# Patient Record
Sex: MALE | Race: WHITE | NOT HISPANIC OR LATINO | Employment: OTHER | URBAN - METROPOLITAN AREA
[De-identification: names, ages, dates, MRNs, and addresses within clinical notes are randomized per-mention and may not be internally consistent; named-entity substitution may affect disease eponyms.]

---

## 2023-07-28 LAB
ALANINE AMINOTRANSFERASE (SGPT) (U/L) IN SER/PLAS: 22 U/L (ref 10–52)
ALBUMIN (G/DL) IN SER/PLAS: 4.1 G/DL (ref 3.4–5)
ALKALINE PHOSPHATASE (U/L) IN SER/PLAS: 49 U/L (ref 33–136)
ANION GAP IN SER/PLAS: 12 MMOL/L (ref 10–20)
ASPARTATE AMINOTRANSFERASE (SGOT) (U/L) IN SER/PLAS: 16 U/L (ref 9–39)
BILIRUBIN TOTAL (MG/DL) IN SER/PLAS: 0.5 MG/DL (ref 0–1.2)
C REACTIVE PROTEIN (MG/L) IN SER/PLAS: 2.18 MG/DL
CALCIUM (MG/DL) IN SER/PLAS: 9.6 MG/DL (ref 8.6–10.6)
CARBON DIOXIDE, TOTAL (MMOL/L) IN SER/PLAS: 29 MMOL/L (ref 21–32)
CHLORIDE (MMOL/L) IN SER/PLAS: 103 MMOL/L (ref 98–107)
CHOLESTEROL (MG/DL) IN SER/PLAS: 231 MG/DL (ref 0–199)
CHOLESTEROL IN HDL (MG/DL) IN SER/PLAS: 60.9 MG/DL
CHOLESTEROL/HDL RATIO: 3.8
CITRULLINE ANTIBODY, IGG: <1 U/ML
CREATINE KINASE (U/L) IN SER/PLAS: 139 U/L (ref 0–325)
CREATININE (MG/DL) IN SER/PLAS: 0.85 MG/DL (ref 0.5–1.3)
ERYTHROCYTE DISTRIBUTION WIDTH (RATIO) BY AUTOMATED COUNT: 11.8 % (ref 11.5–14.5)
ERYTHROCYTE MEAN CORPUSCULAR HEMOGLOBIN CONCENTRATION (G/DL) BY AUTOMATED: 33.9 G/DL (ref 32–36)
ERYTHROCYTE MEAN CORPUSCULAR VOLUME (FL) BY AUTOMATED COUNT: 96 FL (ref 80–100)
ERYTHROCYTES (10*6/UL) IN BLOOD BY AUTOMATED COUNT: 4.26 X10E12/L (ref 4.5–5.9)
GFR MALE: >90 ML/MIN/1.73M2
GLUCOSE (MG/DL) IN SER/PLAS: 102 MG/DL (ref 74–99)
HEMATOCRIT (%) IN BLOOD BY AUTOMATED COUNT: 41 % (ref 41–52)
HEMOGLOBIN (G/DL) IN BLOOD: 13.9 G/DL (ref 13.5–17.5)
LDL: 135 MG/DL (ref 0–99)
LEUKOCYTES (10*3/UL) IN BLOOD BY AUTOMATED COUNT: 10.1 X10E9/L (ref 4.4–11.3)
NRBC (PER 100 WBCS) BY AUTOMATED COUNT: 0 /100 WBC (ref 0–0)
PLATELETS (10*3/UL) IN BLOOD AUTOMATED COUNT: 267 X10E9/L (ref 150–450)
POTASSIUM (MMOL/L) IN SER/PLAS: 4.9 MMOL/L (ref 3.5–5.3)
PROTEIN TOTAL: 6.7 G/DL (ref 6.4–8.2)
RHEUMATOID FACTOR (IU/ML) IN SERUM OR PLASMA: <10 IU/ML (ref 0–15)
SEDIMENTATION RATE, ERYTHROCYTE: 36 MM/H (ref 0–20)
SODIUM (MMOL/L) IN SER/PLAS: 139 MMOL/L (ref 136–145)
TRIGLYCERIDE (MG/DL) IN SER/PLAS: 178 MG/DL (ref 0–149)
UREA NITROGEN (MG/DL) IN SER/PLAS: 20 MG/DL (ref 6–23)
VLDL: 36 MG/DL (ref 0–40)

## 2023-07-31 LAB — ALDOLASE SERUM: 3.7 U/L (ref 1.2–7.6)

## 2023-08-26 PROBLEM — M81.0 AGE RELATED OSTEOPOROSIS: Status: ACTIVE | Noted: 2023-08-26

## 2023-08-26 PROBLEM — R41.841 COGNITIVE COMMUNICATION DEFICIT: Status: ACTIVE | Noted: 2023-08-26

## 2023-08-26 PROBLEM — J38.3 GLOTTIC INSUFFICIENCY: Status: ACTIVE | Noted: 2023-08-26

## 2023-08-26 PROBLEM — Z79.01 CHRONIC ANTICOAGULATION: Status: ACTIVE | Noted: 2023-08-26

## 2023-08-26 PROBLEM — H83.8X9 SUPERIOR SEMICIRCULAR CANAL DEHISCENCE: Status: ACTIVE | Noted: 2023-08-26

## 2023-08-26 PROBLEM — J38.02 BILATERAL VOCAL CORD PARESIS: Status: ACTIVE | Noted: 2023-08-26

## 2023-08-26 PROBLEM — J38.4 VOCAL CORD EDEMA: Status: ACTIVE | Noted: 2023-08-26

## 2023-08-26 PROBLEM — H90.3 ASYMMETRIC SNHL (SENSORINEURAL HEARING LOSS): Status: ACTIVE | Noted: 2023-08-26

## 2023-08-26 PROBLEM — H91.93 HEARING LOSS, BILATERAL: Status: ACTIVE | Noted: 2023-08-26

## 2023-08-26 PROBLEM — H90.A31 MIXED CONDUCTIVE AND SENSORINEURAL HEARING LOSS OF RIGHT EAR WITH RESTRICTED HEARING OF LEFT EAR: Status: ACTIVE | Noted: 2023-08-26

## 2023-08-26 PROBLEM — R48.8 OTHER SYMBOLIC DYSFUNCTIONS: Status: ACTIVE | Noted: 2023-08-26

## 2023-08-26 PROBLEM — I26.99 MULTIPLE PULMONARY EMBOLI (MULTI): Status: ACTIVE | Noted: 2023-08-26

## 2023-08-26 PROBLEM — M79.605 PAIN IN BOTH LOWER EXTREMITIES: Status: ACTIVE | Noted: 2023-08-26

## 2023-08-26 PROBLEM — M79.604 PAIN IN BOTH LOWER EXTREMITIES: Status: ACTIVE | Noted: 2023-08-26

## 2023-08-26 PROBLEM — M79.10 MYALGIA: Status: ACTIVE | Noted: 2023-08-26

## 2023-08-26 PROBLEM — M35.3 POLYMYALGIA RHEUMATICA (MULTI): Status: ACTIVE | Noted: 2023-08-26

## 2023-08-26 PROBLEM — J38.7 PRESBYLARYNGES: Status: ACTIVE | Noted: 2023-08-26

## 2023-08-26 PROBLEM — E78.2 COMBINED HYPERLIPIDEMIA: Status: ACTIVE | Noted: 2023-08-26

## 2023-08-26 PROBLEM — R49.0 HOARSENESS OF VOICE: Status: ACTIVE | Noted: 2023-08-26

## 2023-08-26 RX ORDER — OMEPRAZOLE 40 MG/1
40 CAPSULE, DELAYED RELEASE ORAL
COMMUNITY

## 2023-08-26 RX ORDER — CITALOPRAM 20 MG/1
20 TABLET, FILM COATED ORAL DAILY
COMMUNITY

## 2023-08-26 RX ORDER — PREDNISONE 1 MG/1
1 TABLET ORAL DAILY
COMMUNITY
End: 2023-10-02 | Stop reason: SDUPTHER

## 2023-08-26 RX ORDER — ATORVASTATIN CALCIUM 40 MG/1
40 TABLET, FILM COATED ORAL DAILY
COMMUNITY

## 2023-08-26 RX ORDER — TADALAFIL 5 MG/1
5 TABLET ORAL AS NEEDED
COMMUNITY

## 2023-08-26 RX ORDER — PREDNISONE 5 MG/1
5 TABLET ORAL DAILY
COMMUNITY
End: 2023-10-02 | Stop reason: SDUPTHER

## 2023-08-26 RX ORDER — LISINOPRIL 10 MG/1
10 TABLET ORAL DAILY
COMMUNITY

## 2023-09-18 ENCOUNTER — HOSPITAL ENCOUNTER (OUTPATIENT)
Dept: DATA CONVERSION | Facility: HOSPITAL | Age: 68
Discharge: HOME | End: 2023-09-18
Attending: OTOLARYNGOLOGY | Admitting: OTOLARYNGOLOGY

## 2023-09-18 DIAGNOSIS — H83.11 LABYRINTHINE FISTULA, RIGHT EAR: ICD-10-CM

## 2023-09-18 DIAGNOSIS — H90.3 SENSORINEURAL HEARING LOSS, BILATERAL: ICD-10-CM

## 2023-09-18 DIAGNOSIS — H83.8X9 OTHER SPECIFIED DISEASES OF INNER EAR, UNSPECIFIED EAR: ICD-10-CM

## 2023-09-18 DIAGNOSIS — H90.71 MIXED CONDUCTIVE AND SENSORINEURAL HEARING LOSS, UNILATERAL, RIGHT EAR, WITH UNRESTRICTED HEARING ON THE CONTRALATERAL SIDE: ICD-10-CM

## 2023-09-30 NOTE — H&P
History & Physical Reviewed:   I have reviewed the History and Physical dated:  06-Sep-2023   History and Physical reviewed and relevant findings noted. Patient examined to review pertinent physical  findings.: No significant changes   Home Medications Reviewed: no changes noted   Allergies Reviewed: no changes noted       ERAS (Enhanced Recovery After Surgery):  ·  ERAS Patient: no     Consent:   COVID-19 Consent:  ·  COVID-19 Risk Consent Surgeon has reviewed key risks related to the risk of telma COVID-19 and if they contract COVID-19 what the risks are.     Attestation:   Note Completion:  I am a:  Resident/Fellow   Attending Attestation I saw and evaluated the patient.  I personally obtained the key and critical portions of the history and physical exam or was physically present for key and  critical portions performed by the resident/fellow. I reviewed the resident/fellow?s documentation and discussed the patient with the resident/fellow.  I agree with the resident/fellow?s medical decision making as documented in the note.     I personally evaluated the patient on 18-Sep-2023         Electronic Signatures:  Tabatha Pelayo)  (Signed 18-Sep-2023 13:03)   Authored: Note Completion   Co-Signer: History & Physical Reviewed, ERAS, Consent, Note Completion  Zakia Longoria (Resident))  (Signed 17-Sep-2023 21:14)   Authored: History & Physical Reviewed, ERAS, Consent,  Note Completion      Last Updated: 18-Sep-2023 13:03 by Tabatha Pelayo)

## 2023-10-01 NOTE — OP NOTE
PROCEDURE DETAILS    Preoperative Diagnosis:  1. Right mixed hearing loss  2. Bilateral sensorineural hearing loss  3. Right superior semicircular canal dehiscence   Postoperative Diagnosis:  1. Right mixed hearing loss  2. Bilateral sensorineural hearing loss  3. Right superior semicircular canal dehiscence   Surgeon: Tabatha Pelayo MD  Resident/Fellow/Other Assistant: Zakia Longoria MD     Procedure:  1.  Cochlear Implantation (01520)   -Right    2. Right transmastoid plugging of round window    3. Needle electromyography; cranial nerve supplied muscle(s), unilateral   - Facial nerve.   - Right    4. Microsurgical techniques, requiring use of operating microscope   - Right   Anesthesia: GETA  Estimated Blood Loss: 5  Findings: see op note  Specimens(s) Collected: no,     Complications: none  Implants:   Patient Returned To/Condition: pacu/satisfactory         Operative Report:   Findings:   1. Standard mastoidectomy performed and facial recess opened, round window visualized without issue  2. Round window insertion of  implant from Cochlear, full insertion achieved  3. Round window plugged with fascia and bone james   4. Electrical conduction studies with audiology at end of case confirmed appropriate stimulation of the implant  5. Unable to access superior semicircular canal via transmastoid approach due to narrow space between lateral canal and tegmen     Operative Indications:   Mr. Murguia is a 69yo male with a history of Right moderate to severe mixed hearing loss and Left  moderate to severe sensorineural hearing loss. He also has imaging and symptoms of right superior semicircular canal dehiscence.  Patient was counseled extensively  regarding possible auditory rehabilitative options. Cochlear implant evaluation was completed. Patient was determined to be an appropriate audiometric candidate. Patient's temporal bone CT scan was obtained. He was counseled regarding the expectations,  motivation,  and benefits, risks discussed included, but not limited to, bleeding, infection, device failure, facial paralysis, dizziness, taste disturbance, other unusual complications may occur as well. After this discussion he provided written informed  consent to proceed.    Procedure in Detail:   The patient was seen in preop. Consent was obtained as described above. Correct site was marked. Patient was then taken back to the operative suite and laid supine on the operating table. Pre-operative huddle was performed by Dr. Pelayo. Following induction  of anesthesia, patient was intubated without any difficulty. Perioperative antibiotics were administered. The table was turned 180 degrees. Patient was supported to surgical bed with tapes and belts. The electrodes of the facial nerve monitoring system  were inserted percutaneously in the corresponding position and adequate functioning of the circuit was confirmed. Hair was clipped from his postauricular area. The right ear was clearly exposed.  A pre-incision timeout was performed by Dr. Pelayo. The  postauricular region was injected with a mixture of 1% lidocaine and 1:100,000 epinephrine. The skin was prepped and draped.     A postauricular incision situated about 2 mm behind the postauricular sulcus measuring about 6cm was made using a 15 blade. An anteriorly based flap just superficial to the post-auricular muscle was raised. A posterior flap was also raised at a similar  level. Then a muscular pericranial flap was outlined and elevated exposing the pars squamosa, the temporal bone and the mastoid.     Next, using a combination of cutting and claribel burs, the mastoidectomy was completed. The facial recess was opened with a small claribel bur. Chorda tympani and facial nerve were identified and preserved, and the round window was visualized. A 27g needle  was used to john the round window membrane. Irrigation was performed as well as hemostasis.    A subperiosteal pocket  was created postero-superiorly to place the internal  stimulator. This was done by using blunt dissection, and the size was confirmed using the template. The internal  stimulator was placed in the pocket. Another  pocket was created anteriorly to house the extra temporal electrode. Following this, the implant was opened, the internal  stimulator was placed in the pocket. The active electrode array was introduced into the round window and full insertion  was achieved. No CSF pressure was seen. A fascia plug was inserted into the round window and then bone james was placed to seal the round window. This was done as we were unable to access the superior semicircular canal to repair the dehiscence and did  not want to propagate his third window symptoms. The space between the tegmen and lateral canal was too narrow and we couldn't access it safely or sufficiently enough to perform superior canal plugging. The ground electrode was placed in the extra temporal  pocket. Intra-op xray confirmed appropriate position of the implant.    The muscular flap was then closed over the mastoidectomy cavity and cochlear implant electrodes using interrupted 3-0 vicryl sutures. Dermal closure was done using buried interrupted 3-0 Vicryl, and skin was closed with running 5-0 fast for skin. Impedances  and neural response telemetries were checked and were adequate.     Nato dressing was applied. All needle and sponge counts were correct. The table was turned back towards the anesthesia team, and the patient was awakened and transferred to the PACU in stable condition.    Dr. Pelayo was present and participated in all critical portions of the procedure.,                        Attestation:   Note Completion:  Attending Attestation I was present for key portions of the procedure and the procedure lasted longer than 5 minutes.    I am a: Resident/Fellow         Electronic Signatures:  Tabatha Pelayo)  (Signed  18-Sep-2023 13:24)   Authored: Note Completion   Co-Signer: Post-Operative Note, Chart Review, Note Completion  Zakia Longoria (Resident))  (Signed 18-Sep-2023 11:38)   Authored: Post-Operative Note, Chart Review, Note Completion      Last Updated: 18-Sep-2023 13:24 by Tabatha Pelayo)

## 2023-10-02 DIAGNOSIS — M35.3 POLYMYALGIA RHEUMATICA (MULTI): Primary | ICD-10-CM

## 2023-10-02 DIAGNOSIS — M35.3 POLYMYALGIA RHEUMATICA (MULTI): ICD-10-CM

## 2023-10-02 RX ORDER — PREDNISONE 1 MG/1
2 TABLET ORAL DAILY
Qty: 40 TABLET | Refills: 1 | Status: SHIPPED | OUTPATIENT
Start: 2023-10-02 | End: 2023-10-02 | Stop reason: ENTERED-IN-ERROR

## 2023-10-02 RX ORDER — PREDNISONE 5 MG/1
5 TABLET ORAL DAILY
Qty: 30 TABLET | Refills: 1 | Status: SHIPPED | OUTPATIENT
Start: 2023-10-02 | End: 2023-12-04 | Stop reason: SDUPTHER

## 2023-10-02 RX ORDER — PREDNISONE 5 MG/1
5 TABLET ORAL DAILY
Qty: 30 TABLET | Refills: 1 | Status: SHIPPED | OUTPATIENT
Start: 2023-10-02 | End: 2023-10-02 | Stop reason: ENTERED-IN-ERROR

## 2023-10-04 ENCOUNTER — APPOINTMENT (OUTPATIENT)
Dept: LAB | Facility: LAB | Age: 68
End: 2023-10-04
Payer: MEDICARE

## 2023-10-04 ENCOUNTER — OFFICE VISIT (OUTPATIENT)
Dept: RHEUMATOLOGY | Facility: CLINIC | Age: 68
End: 2023-10-04
Payer: MEDICARE

## 2023-10-04 VITALS
WEIGHT: 196.8 LBS | BODY MASS INDEX: 25.26 KG/M2 | SYSTOLIC BLOOD PRESSURE: 126 MMHG | HEART RATE: 74 BPM | HEIGHT: 74 IN | DIASTOLIC BLOOD PRESSURE: 69 MMHG

## 2023-10-04 DIAGNOSIS — M35.3 POLYMYALGIA RHEUMATICA (MULTI): Primary | ICD-10-CM

## 2023-10-04 LAB
CRP SERPL-MCNC: 0.91 MG/DL
ERYTHROCYTE [DISTWIDTH] IN BLOOD BY AUTOMATED COUNT: 11.8 % (ref 11.5–14.5)
HCT VFR BLD AUTO: 42.8 % (ref 41–52)
HGB BLD-MCNC: 14.2 G/DL (ref 13.5–17.5)
MCH RBC QN AUTO: 31.8 PG (ref 26–34)
MCHC RBC AUTO-ENTMCNC: 33.2 G/DL (ref 32–36)
MCV RBC AUTO: 96 FL (ref 80–100)
NRBC BLD-RTO: 0 /100 WBCS (ref 0–0)
PLATELET # BLD AUTO: 282 X10*3/UL (ref 150–450)
PMV BLD AUTO: 10.3 FL (ref 7.5–11.5)
RBC # BLD AUTO: 4.46 X10*6/UL (ref 4.5–5.9)
WBC # BLD AUTO: 10.5 X10*3/UL (ref 4.4–11.3)

## 2023-10-04 PROCEDURE — 99213 OFFICE O/P EST LOW 20 MIN: CPT | Mod: PO | Performed by: INTERNAL MEDICINE

## 2023-10-04 PROCEDURE — 86140 C-REACTIVE PROTEIN: CPT

## 2023-10-04 PROCEDURE — 1160F RVW MEDS BY RX/DR IN RCRD: CPT | Performed by: INTERNAL MEDICINE

## 2023-10-04 PROCEDURE — 36415 COLL VENOUS BLD VENIPUNCTURE: CPT

## 2023-10-04 PROCEDURE — 1126F AMNT PAIN NOTED NONE PRSNT: CPT | Performed by: INTERNAL MEDICINE

## 2023-10-04 PROCEDURE — 99213 OFFICE O/P EST LOW 20 MIN: CPT | Performed by: INTERNAL MEDICINE

## 2023-10-04 PROCEDURE — 85027 COMPLETE CBC AUTOMATED: CPT

## 2023-10-04 PROCEDURE — 1159F MED LIST DOCD IN RCRD: CPT | Performed by: INTERNAL MEDICINE

## 2023-10-04 PROCEDURE — 1036F TOBACCO NON-USER: CPT | Performed by: INTERNAL MEDICINE

## 2023-10-04 NOTE — PROGRESS NOTES
Subjective   Patient ID: Ulysses Murguia is a 68 y.o. male who presents for Follow-up.    HPI  This is a 69 yo male, is here as a new patient with the chief complain of bilateral proximal lower extremity pain and weakness for 3 years.   Patient says that he started having pain and weakness in his thighs about 2-3 years ago. He had trouble getting up from a chair, getting up from the toilet seat. He was first seen by an orthopedic surgeon and initially thought his symptoms were due to swollen knees. But after doing x-rays of his knees he was told that his symptoms are not due to knee problem, but most likely due to PMR. Then he was seen by a nurse practitioner around 02/2021 and started him on prednisone course (15mgx 5d, 10mgx 5d, 5mgx 5d)for which he responded fast. He felt better the next day. Once he stopped prednisone he did ok for a while. But around spring of 2022 his symptoms came back and this time it was severe. He was seen by multiple rheumatologist at [facility] and was told he does not have PMR and was sent home. Around 01/2023 his symptoms got worsen to the point he couldn't again get up from a seated position. He was prescribed a course of prednisone by a general practitioner at [place]. HE was on prednisone 15mg for few days, then 10mg for few days and then stayed for 5mg for few weeks. But while on prednisone 5mg around 04/2023 his symptoms got worse. So he himself increased the dose to 15mg for 5 days and now on 10mg for the last 3 days.  He denies any significant pain or weakness in his upper extremities. Denies any headaches, blurry vision, jaw pain, numbness or tingling. Denies any joint pain or swelling. AM stiffness lasts for one hour. He also c/o trouble opening jars.   He has a hx of fall in the boat and had a fracture in his lumbar spine in the past.   He had extensive blood work in the past which showed normal ESR, CPK, AST, ALT, aldolase. He only had one positive CRP of 1.6 on 10/2022, rest of the  labs were normal. Hs has been on a statin since 10/2022.     07/23  When he tapered his PRD to 10 mg, his symptoms came back. He called me and we increased his PRD dose to 12.5 mg daily.  Today, he is doing better. He reports only mild thigh pain, but no stiffness.  He denies any cranial symptoms, visual problem.     PMHx: HTN, HLD, hearing loss, hx of thrombophlebitis and PE  Allergies: NKDA  Family hx: no family hx of autoimmune diseses  Social hx: denies smoking, alcohol or other recreational drugs, wife is a retired anesthesiologist      Interval history:  Today, he is doing well. He denies any hip pian, AM stiffness or any other joint pain.  He tapered his PRD to 8 mg daily    ROS  Joint pain in hands: negative  Joint swelling: negative  Morning stiffness and duration:   strength: normal  Oral ulcer: negative  Genital ulcer: negative  Raynaud phenomenon: negative  Chest pain/dyspnea: negative  Low back pain: negative  Visual problem: negative  Dry eyes/dry mouth: negative  Skin rash/scaling/psoriasis: negative       Objective     PEXAM  VS reviewed, WNL  General: Alert, no distress   HEENT: Normocephalic/atraumatic, No alopecia. PERRLA. Sclera white, conjunctiva pink, no malar rash. no oral or nasal ulcer. Oral cavity pink and moist, no erythema or exudate, dentition good.   Neck: supple  Respiratory: CTA B, no adventitious breath sounds  Cardiac: RRR, no murmurs, carotid, or bruits  Abdominal: symmetrical, soft, non-tender, non-distended, normoactive BSx4 quadrants, no CVA tenderness or suprapubic tenderness  MSK: Joints of upper and lower extremities were assessed for synovitis and ROM.    Today she has no evidence of synovitis in the joints of her hands or wrists, tender joint count 0, swollen joint count 0   Extremities: no clubbing, no cyanosis, no edema  Skin: Skin warm and moist.   Neuro: non-focal, Strength 5/5 throughout. Normal gait. No cerebellar pathologic exam     Assessment/Plan   This is a 68  yo male, is here as a new patient with the pain and weakness in bilateral proximal lower extremity for 2-3 years.     Assessment:  He has some positive symptoms and signs suggestive of PMR including pain in thighs responding to prednisone, one time elevated CRP in 10/2022. But he also has some atypical symptoms. He has weakness which is not very typical for PMR and his ESR, CRP, CPK has been normal most of the times which argues against PMR.  He does not have any GCA symptoms.     He labs showed normal ESR, CPK, AST, ALT, aldolase. He only had one positive CRP of 1.6 on 10/2022, rest of the labs were normal. Hs has been on a statin since 10/2022.    At this time his diagnosis is unclear. But because his symptoms are responding to prednisone we will treat empirically for possible PMR with longer prednisone taper. He stopped statin treatment due to possible statin associated myalgia. But, he has not noted any changes.   Overall, he was diagnosed with PMR before and steroid significantly helped his pain. Then, he stopped PRD and he had severe symptoms, he started PRD again. We saw him and tried steroid tapering, but could not tolerate tapering after 12.5 mg.  His PMR is atypical, it presented with lower extremity proximal symptoms and knee pain, swelling, no upper extremity symptoms, cranial symptoms or peripheral arthritis, classical morning stiffness. Also, his acute phases were normal. Overall, his PMR diagnosis is suspicious, but responded steroid before.  Today, he is doing well without any active joint pian, hip pain or AM stiffness.  He tapered his PRD to 8 mg daily and tolerated it very well.    -will check his ESR, cRP, CBC  -will continue steroid tapering 1 mg very 2 weeks until 5 mg, after 5 mg , will taper it 2 mg every 3 weeks.  -will see him in 2 months

## 2023-10-17 ENCOUNTER — OFFICE VISIT (OUTPATIENT)
Dept: OTOLARYNGOLOGY | Facility: CLINIC | Age: 68
End: 2023-10-17
Payer: MEDICARE

## 2023-10-17 ENCOUNTER — CLINICAL SUPPORT (OUTPATIENT)
Dept: AUDIOLOGY | Facility: CLINIC | Age: 68
End: 2023-10-17
Payer: MEDICARE

## 2023-10-17 VITALS — TEMPERATURE: 97.5 F | WEIGHT: 199.5 LBS | BODY MASS INDEX: 25.6 KG/M2 | HEIGHT: 74 IN

## 2023-10-17 DIAGNOSIS — H90.3 ASYMMETRIC SNHL (SENSORINEURAL HEARING LOSS): Primary | ICD-10-CM

## 2023-10-17 DIAGNOSIS — J38.7 PRESBYLARYNGES: ICD-10-CM

## 2023-10-17 DIAGNOSIS — H90.A31 MIXED CONDUCTIVE AND SENSORINEURAL HEARING LOSS OF RIGHT EAR WITH RESTRICTED HEARING OF LEFT EAR: ICD-10-CM

## 2023-10-17 DIAGNOSIS — Z98.890 POST-OPERATIVE STATE: ICD-10-CM

## 2023-10-17 DIAGNOSIS — H90.3 SENSORINEURAL HEARING LOSS (SNHL) OF BOTH EARS: Primary | ICD-10-CM

## 2023-10-17 DIAGNOSIS — Z96.21 COCHLEAR IMPLANT IN PLACE: ICD-10-CM

## 2023-10-17 PROCEDURE — 92553 AUDIOMETRY AIR & BONE: CPT | Mod: 59 | Performed by: AUDIOLOGIST

## 2023-10-17 PROCEDURE — 1159F MED LIST DOCD IN RCRD: CPT | Performed by: OTOLARYNGOLOGY

## 2023-10-17 PROCEDURE — 1160F RVW MEDS BY RX/DR IN RCRD: CPT | Performed by: OTOLARYNGOLOGY

## 2023-10-17 PROCEDURE — 99024 POSTOP FOLLOW-UP VISIT: CPT | Performed by: OTOLARYNGOLOGY

## 2023-10-17 PROCEDURE — 1126F AMNT PAIN NOTED NONE PRSNT: CPT | Performed by: OTOLARYNGOLOGY

## 2023-10-17 PROCEDURE — 92603 COCHLEAR IMPLT F/UP EXAM 7/>: CPT | Performed by: AUDIOLOGIST

## 2023-10-17 PROCEDURE — 1036F TOBACCO NON-USER: CPT | Performed by: OTOLARYNGOLOGY

## 2023-10-17 PROCEDURE — 92567 TYMPANOMETRY: CPT | Mod: 59 | Performed by: AUDIOLOGIST

## 2023-10-17 ASSESSMENT — PATIENT HEALTH QUESTIONNAIRE - PHQ9
SUM OF ALL RESPONSES TO PHQ9 QUESTIONS 1 AND 2: 0
1. LITTLE INTEREST OR PLEASURE IN DOING THINGS: NOT AT ALL
2. FEELING DOWN, DEPRESSED OR HOPELESS: NOT AT ALL

## 2023-10-17 NOTE — PATIENT INSTRUCTIONS
Welcome to Dr. Pelayo's clinic. We are here to assist you through your ENT care at [facility].  Dr. Pelayo is an Ear surgeon. This means that she specializes in taking care of patients with complex ear problems.     Dr. Pelayo's office number is [phone]. While you may see her at a satellite office, she has a team committed to help meet your healthcare needs at [facility]. This number is the most direct way to communicate with the office.     [place] is Dr. Pelayo's  and she answers the office phone from 8am-4pm Mon-Fri. She can help you with many general questions and information. Questions that she cannot answer will be directed to the appropriate staff. You may need to leave a message. In this case, someone from the team will call you back.     Mykel is Dr. Pelayo's primary nurse and can be reached by calling the office. Mykel is in clinic with Dr. Pelayo's on Mondays and Tuesdays. Non-urgent calls will be returned on non-clinic days typically Thursdays.     Sometimes, other team members will also be involved in your care. These people may include dieticians, social workers, speech therapists, audiologist, neurologist, and physical therapist. Dr. Pelayo will provide these referrals as needed. Please let her know if you would like to request a specific referral.     For your convenience, Dr. Pelayo sees patients at several [facility] locations including [facility] and [facility] at the main campus of [facility]. While we try to make your appointments as convenient as possible, occasionally a visit to another location may be necessary to provide the best care for you. We look forward to working with you to meet your healthcare goals.     Dr. Pelayo makes every effort to run on time for your appointments. Therefore, if you are more than 30 minutes late unrelated to a scan or another appointment such therapy or audio you will have to reschedule.

## 2023-10-17 NOTE — LETTER
October 17, 2023     Carmen Nguyen  [address] [place] [address]    Patient: Ulysses Murguia   YOB: 1955   Date of Visit: 10/17/2023       Dear Dr. Carmen Nguyen:    I had the pleasure of seeing your patient, Ulysses Murguia today. Below are my notes for this consultation.  If you have questions, please do not hesitate to call me. Thank you for allowing me to participate in the care of your patient.        Sincerely,     Tabatha Pelayo MD      CC: No Recipients  _____________________________________________________________________________    68 y.o. male with a history of bilateral sensorineural hearing loss, right mixed hearing loss, and right SSCD, he is s/p right transmastoid plugging of the round window and right cochlear implantation on 9/18/23. Here for first post op visit with good wound healing of the post auricular incision. Improvement in symptoms related to SCCD - improvement in autophony, minimal dizziness, improvement in baseline hearing on the right     -Audio today for CI activation and continued visits with audio therafter  -Follow-up in 3 months (virtually as patient will be in [place])    Seen and evaluated with attending Dr. Pierce Bradford MD  PGY-5 - Otolaryngology    I saw and evaluated the patient. I personally obtained the key and critical portions of the history and physical exam or was physically present for key and critical portions performed by the resident/fellow. I reviewed the resident/fellow's documentation and discussed the patient with the resident/fellow. I agree with the resident/fellow's medical decision making as documented in the note.    Tabatha Pelayo MD

## 2023-10-17 NOTE — PROGRESS NOTES
History Of Present Illness:  Ulysses Murguia is a 68 y.o. male with a history of bilateral sensorineural hearing loss, right mixed hearing loss, and right SSCD, he is s/p right transmastoid plugging of the round window and right cochlear implantation on 9/18/23.     He is here for his first post operative appointment. No major issues after surgery. He reports significant improvement in autophony. His hearing actually feels better on the right than pre op. No dizziness - he did have a fall while working in the yard and balancing on a beam however he did not have a vertiginous episode at this time. He is undergoing activation today.     Recall 6/13/23:  Ulysses is a 68 year old male with a history of sensorineural hearing loss, referred here today by Dr. Gambino to discuss a cochlear implant.     He is reportedly hearing loss in duration of 15 years. He has been aided for approximately that time in both ears. The right ear is his worst ear. He has a difficult time scheduling noise environments but also in quiet environments. He has to face his wife directly in order to understand her speech. Has a history of noise exposure as he previously worked as a manager at a factory. No family history of hearing loss. Does report a history of right superior semicircular canal dehiscence which is not surgically been treated. He has had multiple middle ear explorations without any intervention. Reports popping, pulsatile tinnitus, and hearing himself to the right ear. Denies any vestibular symptoms. Does have some chronic imbalance but believes this is due to his polymyalgia rheumatica which is getting better over time.    Surgical History:  CI - on 9/18/2023      Allergies:  Patient has no known allergies.    Medications:   Current Outpatient Medications   Medication Instructions    atorvastatin (LIPITOR) 40 mg, oral, Daily, TAKE PER DIRECTED    citalopram (CELEXA) 20 mg, oral, Daily, TAKE PER DIRECTED    lisinopril 10 mg, oral,  "Daily, TAKE PER DIRECTED    omeprazole (PRILOSEC) 40 mg, oral, Do not crush or chew.  TAKE PER DIRECTED    predniSONE (DELTASONE) 5 mg, oral, Daily    tadalafil (CIALIS) 5 mg, oral, As needed        Review of Systems:   A comprehensive 10-point review of systems was obtained including constitutional, neurological, HEENT, pulmonary, cardiovascular, genito-urinary, and other pertinent systems and was negative except as noted in the HPI.      Physical Exam:  Constitutional   General appearance: Healthy-appearing, well-nourished, well groomed, in no acute distress.   Ability to communicate: Normal communication without aids, normal voice quality.       Head and face: Atraumatic with no masses, lesions, or scarring.   Facial strength: Normal strength and symmetry, no synkinesis or facial tic.     Ears  Otoscopic examination: Bilateral normal otoscopy of the tympanic membrane, there is evidence of the bone dust we placed intra-op on otoscopy on the right   Right post auricular incision is c/d/I, well healed, no pain, implant is palpable and skin overlying is healthy      Nose: Dorsum symmetric with no visible or palpable deformities.     Oral Cavity/Mouth  Lips, teeth, and gums: Normal lips, gums, and dentition.     Oropharynx: Mucosa moist, no lesions.     Neck: Symmetrical, trachea midline. No masses visible.     Neurological/Psychiatric  Cranial Nerve Examination: II - XII grossly intact.  Orientation to person, place, and time: Normal.  Mood and affect: Normal.     Skin: Normal without rashes or lesions.     Pulmonary  Respiratory effort: Chest expands symmetrically.     Cardiovascular: Good peripheral pulses  Peripheral vascular system: No varicosities, carotid pulse normal, no edema. No jugular venous distension.     Extremities: Appearance of extremities: Normal. Gait normal.     Procedure:  N/a     Last Recorded Vitals:  Temperature 36.4 °C (97.5 °F), height 1.867 m (6' 1.5\"), weight 90.5 kg (199 lb 8 " oz).    Relevant Results:       Assessment/Plan   68 y.o. male with a history of bilateral sensorineural hearing loss, right mixed hearing loss, and right SSCD, he is s/p right transmastoid plugging of the round window and right cochlear implantation on 9/18/23. Here for first post op visit with good wound healing of the post auricular incision. Improvement in symptoms related to SCCD - improvement in autophony, minimal dizziness, improvement in baseline hearing on the right     -Audio today for CI activation and continued visits with audio therafter  -Follow-up in 3 months (virtually as patient will be in [place])    Seen and evaluated with attending Dr. Pierce Bradford MD  PGY-5 - Otolaryngology    I saw and evaluated the patient. I personally obtained the key and critical portions of the history and physical exam or was physically present for key and critical portions performed by the resident/fellow. I reviewed the resident/fellow's documentation and discussed the patient with the resident/fellow. I agree with the resident/fellow's medical decision making as documented in the note.    Tabatha Pelayo MD

## 2023-10-17 NOTE — PROGRESS NOTES
COCHLEAR IMPLANT INITIAL ACTIVATION    Ulysses Murguia   was seen for the initial stimulation and programming of his Nucleus  cochlear implant at the right ear, used in conjunction with the [id number] ear level speech processor.  He presents with a bilateral progressive sensorineural hearing loss.  He continues to utilize his loaner Phonak MAGNUS behind the ear hearing aid at the contralateral left ear. Mr. Murguia underwent the surgical implantation of the  cochlear implant at the right ear on 9/18/23.  Post operative course remains unremarkable.  Please see medical records for further medical history.  Patient reports residual hearing remains. He presents wearing both Phonak [facility] loaner hearing aids.    AUDIOLOGIC RESULTS  Unaided pure tone air and bone thresholds assessed post-operatively. Left ear stable. Right implanted ear thresholds declined at select frequencies, however residual hearing was essentially preserved.      MAPPING PROCEDURE  4i magnet is use today.  Impedances were evaluated using the ear level [id number] speech processor for intra-cochlear electrodes 1-22 in common ground (CG), Monopolar 1 (MP1), Monopolar 2 (MP2) and MP1+2 stimulation modes.  Satisfactory impedances were found for all electrodes, in each stimulation mode.    The [id number] processor and remote control were dispensed with all associated equipment and accessories.  The processor was programmed in an MP1+2 stimulation mode using an ACE speech processing strategy, 8 maxima and a 900 Hz stimulation rate.  Electrodes 1-22 were activated.  Using Population Mean a MAP was created with good reliability.  No facial nerve stimulation was observed.  Progressive maps were created and programmed with increments of 5 clinical units. C-levels were swept for comfort in loudest listening program.     Acoustic component ws attached and a Hybrid MAP was created today. Patient reported good sound quality. Ultimately it was decided to remove the  acoustic component today and retain the electrical stimulation MAP only for patient to adapt to implant stimulation. Will likely reinstate use of acoustic component at next visit.    Did you meet with the [employer]  prior to surgery:  Yes   Did you find this meeting helpful? Yes   Did you recommend this meeting to a family member or friend who is considering a cochlear implant:  NA  Was patient more prepared for CI programming/activation due to meeting with the [employer] :  Yes       DISCUSSION  Positive stimulation was obtained on electrodes 1-22.  A reliable Population Mean map was defined in the ACE speech processing strategy.  Appropriate behavior was observed when the Maps were tested.  Mr. Murguia was oriented to the speech processor, remote control and accessories.  He was also given written instructional materials.  All equipment will be registered electronically.  The Nucleus Smart Viviana was downloaded on patient's iPhone and the processor was paired and connected.  Use of the Viviana was reviewed today. Wireless accessories will be paired and reviewed at a future visit. Patient is interested in purchasing Resound bimodal hearing aid. Due to recent release of Telelogosia platform, patient wishes to wait for bimodal compatibility in this device before purchasing.    TREATMENT PLAN  1. Utilize the Nucleus  Cochlear Implant System and the [id number] speech processor daily moving through progressive programs P1-P4 as tolerated.  2. Return for remapping and optimization of the SM6102 speech processor as scheduled in approximately two  weeks in order to optimize the psychophysical ACE map.  3. Utilize aural rehabilitation/auditory training techniques at home to improve speech understanding ability.  Initiate formal auditory training through this facility as discussed.  4. Return to Dr. Pelayo as directed for medical management.     [phone]

## 2023-10-30 ENCOUNTER — CLINICAL SUPPORT (OUTPATIENT)
Dept: AUDIOLOGY | Facility: CLINIC | Age: 68
End: 2023-10-30
Payer: MEDICARE

## 2023-10-30 DIAGNOSIS — H90.3 SENSORINEURAL HEARING LOSS (SNHL) OF BOTH EARS: Primary | ICD-10-CM

## 2023-10-30 DIAGNOSIS — Z96.21 COCHLEAR IMPLANT IN PLACE: ICD-10-CM

## 2023-10-30 PROCEDURE — 92604 REPROGRAM COCHLEAR IMPLT 7/>: CPT | Performed by: AUDIOLOGIST

## 2023-10-30 NOTE — PROGRESS NOTES
COCHLEAR IMPLANT SECOND STIMULATION    Ulysses Murguia was seen for the second stimulation and programming of his Nucleus  cochlear implant at the right ear, used in conjunction with the CS5891 ear level speech processor.  He presents with a bilateral progressive sensorineural hearing loss.  He continues to utilize his loaner Phonak MAGNUS behind the ear hearing aid at the contralateral left ear, however the trail period has ended for this device. Mr. Murguia underwent the surgical implantation of the  cochlear implant at the right ear on 9/18/23.  Please see medical records for further medical history.  Patient reports residual hearing remains. Mr. Murguia called last week stating he lost his processor within the week of his activation. He spent 6 hours looking throughout his property, tried using the 'Find my Processor' feature but was unable to locate it. The Loss claim was filed last week and he present today with replacement equipment.       AUDIOLOGIC RESULTS  Not re-assessed due to time constraints.    MAPPING PROCEDURE  4i magnet is use today.  Impedances were evaluated using the ear level OO2011 speech processor for intra-cochlear electrodes 1-22 in common ground (CG), Monopolar 1 (MP1), Monopolar 2 (MP2) and MP1+2 stimulation modes.  Satisfactory impedances were found for all electrodes, in each stimulation mode.    The BN3332 processor and remote control were dispensed with all associated equipment and accessories.  The processor was programmed in an MP1+2 stimulation mode using an ACE speech processing strategy, 8 maxima and a 900 Hz stimulation rate.  Electrodes 1-22 remain activated.  Using standard audiometric technique a psychophysical MAP was created with good reliability.  No facial nerve stimulation was observed.  Maps were created and programmed as follows:    P1=SCAN 2  P2=SCAN 2 w/ Forward Focus  P3=Restaurant  P4=Groups     Acoustic component ws attached and a Hybrid MAP was created today.  Patient reported good sound quality. Ultimately it was decided to retain the acoustic component today.    Patient returned Phonak Lumity rechargeable MAGNUS hearing aid. This will be returned to [place] office.   Dispensed Phonak Prognomixeo  MAGNUS hearing aid today from [employer]/trial stock for use at the left ear while awaiting Resound Nexia bimodal compatibility.       DISCUSSION  Positive stimulation was obtained on electrodes 1-22.  A reliable Population Mean map was defined in the ACE speech processing strategy.  Appropriate behavior was observed when the Maps were tested.  Mr. Murguia was oriented to the speech processor, remote control and accessories.  He was also given written instructional materials.  All equipment will be registered electronically.  The Nucleus Smart Viviana was downloaded on patient's iPhone and the processor was paired and connected.  Use of the Viviana was reviewed today. Wireless accessories will be paired and reviewed at a future visit. Patient is interested in purchasing Resound bimodal hearing aid. Due to recent release of Nexia platform, patient wishes to wait for bimodal compatibility in this device before purchasing.    TREATMENT PLAN  1. Utilize the Nucleus  Cochlear Implant System and the EY2235 speech processor daily using the most tolerated program.  2. Return for remapping and optimization of the KM1292 speech processor as scheduled in approximately four weeks in order to optimize the psychophysical ACE map.  3. Utilize aural rehabilitation/auditory training techniques at home to improve speech understanding ability.  Initiate formal auditory training through this facility as discussed.  4. Return to Dr. Pelayo as directed for medical management.     [phone]

## 2023-11-27 DIAGNOSIS — M35.3 POLYMYALGIA RHEUMATICA (MULTI): Primary | ICD-10-CM

## 2023-11-27 RX ORDER — PREDNISONE 1 MG/1
7 TABLET ORAL DAILY
Qty: 210 TABLET | Refills: 0 | Status: SHIPPED | OUTPATIENT
Start: 2023-11-27 | End: 2023-12-04 | Stop reason: SDUPTHER

## 2023-11-29 ENCOUNTER — CLINICAL SUPPORT (OUTPATIENT)
Dept: AUDIOLOGY | Facility: CLINIC | Age: 68
End: 2023-11-29
Payer: MEDICARE

## 2023-11-29 DIAGNOSIS — Z96.21 COCHLEAR IMPLANT IN PLACE: ICD-10-CM

## 2023-11-29 DIAGNOSIS — H90.3 SENSORINEURAL HEARING LOSS (SNHL) OF BOTH EARS: Primary | ICD-10-CM

## 2023-11-29 PROCEDURE — 92604 REPROGRAM COCHLEAR IMPLT 7/>: CPT | Performed by: AUDIOLOGIST

## 2023-11-29 PROCEDURE — V5257 HEARING AID, DIGIT, MON, BTE: HCPCS | Mod: AUDSP,MUE | Performed by: AUDIOLOGIST

## 2023-11-29 PROCEDURE — V5241 DISPENSING FEE, MONAURAL: HCPCS | Mod: AUDSP,MUE | Performed by: AUDIOLOGIST

## 2023-11-29 PROCEDURE — 92626 EVAL AUD FUNCJ 1ST HOUR: CPT | Mod: 59 | Performed by: AUDIOLOGIST

## 2023-11-29 NOTE — PROGRESS NOTES
COCHLEAR IMPLANT PROGRAMMING and FUNCTIONAL TESTING       Name:  Ulysses Murguia  :  1955  Age:  68 y.o.  Date of Evaluation:  2023     RIGHT DEVICE  External Device: Cochlear PQ0980 (N8) sound processor  Internal Device: Cochlear Intervolves   Surgeon: Tabatha Pelayo MD  Implantation Date: 2023      LEFT DEVICE  Loaner Phonak hearing aid. ReSound Nexia device ordered today in silver with length 1  with hopes for bimodal compatibility when the update is available for this platform.     HISTORY  Ulysses Murguia arrives today for programming and optimization of the right cochlear implant used in conjunction with a Cochlear XN4763 (N8) sound processor ([id number]). The patient reports overall benefit with his device. He stated that sound quality remains fuzzy, but states that it is getting better as he practices. He cited reading aloud with his wife as his primary listening practice. Recall, Mr. Murguia had previously activated his loss and damage claim after misplacing his right N8 processor. Today, he arrived having found the processor in his shed. He is aware there is no longer a valid warranty on this device ([id number]).    Recall, Ulysses Murguia has a documented history of severe to profound sensorineural hearing loss, bilaterally.    PROGRAMMING  Headset pressure, magnet strength (4I), and incision site were checked with no problems noted. Datalogging revealed 13+ hours of use per day with 2+ hours in speech, on average since the last appointment. A length 2 acoustic  was attached to the N8 processor  today, the previous length 3 was too long.  Mini Microphone was paired to the processor and demonstrated today.    Electrode impedances, used to monitor internal device function, were measured across the electrode array.  Impedance measures were within normal limits for all electrodes, in each stimulation mode. Current parameters of MP1+2 stimulation mode, an ACE speech processing  strategy, 8 maxima, and a 900 Hz stimulation rate were maintained.  Threshold (T) levels were measuring using up-down bracketing method on a variety of channels with the remaining channels interpolated. Comfort (C) levels were measuring using psychophysical loudness scaling method on a variety of channels with the remaining channels interpolated. No facial stimulation or non-auditory stimulation observed during live speech. Current programming consists of:  Program 1 - SCAN 2  Program 2 - SCAN 2 w/ Forward Focus  Program 3 - Restaurant  Program 4 - Groups    *Mapping was successfully copied to other processor.     FUNCTIONAL TESTING  Testing prior to programming at user settings (P2)    All testing was completed in the soundfield at 0 degrees azimuth. Pure tone testing was obtained using pulsed frequency modulating (FM) stimuli. Sentence and word recognition testing was performed with recorded material at 60 dBSPL.    C = right Cochlear IM3635 (N8) sound processor   Aided thresholds obtained 30-40 dBHL for 250-6000 Hz.    LISTENING CONDITION CNC Words  AzBio Sentences in Quiet  AzBio Sentences in +10 SNR 4-talker babble (S0N0)    Right CI only 40% 76% DNT     The Cochlear Implant Quality of Life-35 Profile (CIQOL-35 Profile) is a patient-reported outcome measure that was developed to assess the functional ability of adult cochlear users in 6 domains: Communication, Emotional, Entertainment, Environmental, Listening effort, and Social. The patient's CIQOL-10 global raw score is 27 and converted score is 42.26.     IMPRESSIONS  Positive stimulation on all active electrodes.  A reliable psychophysical MAP was defined in the ACE speech processing strategy. Testing revealed stable recognition and detection since last evaluation (10/31/23) and significant improvement in recognition when compared to pre-operative scores. Mr. Murguia decided to pursue a ReSound Nexia device for his left ear. After discussing his options and  preferences for the fit and style of device, we ordered a ReSound Nexia 9 in color silver with a length 1 . He affirmed that he will schedule his hearing aid fitting at the  at the end of today's appointment. Device was paid for in full to opt into the prompt pay rate.     RECOMMENDATIONS  1. Continue medical follow-up with your neuro-otologist (Dr. Pelayo, Dr. Rhodes, Dr. Lane or Dr. Hunter)  2. Utilize the Cochlear [place] Nucleus Cochlear XS9406 (N8) sound processor daily using the most tolerated program.  3. Return annually for programming, optimization, and functional testing.   4. Utilize aural rehabilitation/auditory training programs, books on tape, and written materials at home to improve speech understanding ability. Time should be spent in right CI only condition for rehab.  5. Communication strategies were discussed (utilizing visual cues/gestures; reducing background noise and distance from desired source; increasing light to assist with visual cues; use of clear speech).  6. Follow up with Dr. Quinteros for hearing aid fitting and continued care and maintenance of bimodal devices.                           LEI Javier under the supervision of Abebe Hanks CCC-A    TIME: 6944-5980

## 2023-12-04 ENCOUNTER — TELEPHONE (OUTPATIENT)
Dept: RHEUMATOLOGY | Facility: CLINIC | Age: 68
End: 2023-12-04

## 2023-12-04 DIAGNOSIS — M35.3 POLYMYALGIA RHEUMATICA (MULTI): ICD-10-CM

## 2023-12-04 RX ORDER — PREDNISONE 5 MG/1
5 TABLET ORAL DAILY
Qty: 30 TABLET | Refills: 1 | Status: SHIPPED | OUTPATIENT
Start: 2023-12-04 | End: 2023-12-04 | Stop reason: SDUPTHER

## 2023-12-04 RX ORDER — PREDNISONE 5 MG/1
5 TABLET ORAL DAILY
Qty: 60 TABLET | Refills: 1 | Status: SHIPPED | OUTPATIENT
Start: 2023-12-04 | End: 2024-01-25 | Stop reason: SDUPTHER

## 2023-12-04 RX ORDER — PREDNISONE 1 MG/1
7 TABLET ORAL DAILY
Qty: 210 TABLET | Refills: 0 | Status: SHIPPED | OUTPATIENT
Start: 2023-12-04 | End: 2023-12-22

## 2023-12-12 ENCOUNTER — CLINICAL SUPPORT (OUTPATIENT)
Dept: AUDIOLOGY | Facility: CLINIC | Age: 68
End: 2023-12-12
Payer: MEDICARE

## 2023-12-12 DIAGNOSIS — H90.3 SENSORINEURAL HEARING LOSS (SNHL) OF BOTH EARS: Primary | ICD-10-CM

## 2023-12-12 DIAGNOSIS — Z96.21 COCHLEAR IMPLANT IN PLACE: ICD-10-CM

## 2023-12-12 PROCEDURE — V5264 EAR MOLD/INSERT: HCPCS | Mod: LT | Performed by: AUDIOLOGIST

## 2023-12-12 NOTE — PROGRESS NOTES
HEARING AID FITTING         Name:  Ulysses Murguia  :  1955  Age:  68 y.o.  Date of Evaluation:  2023     Ear Make and Model Serial Number /Tube size Dome Warranty Expiration   Left ReSound` [id number] *** Small Power 2027     HISTORY  Patient arrived today for hearing aid fitting due to moderate to severe sensorineural hearing loss.  Patient was previously fit with a loCommand Information Phonak device. He currently wears a Cochlear Nucleus 8 processor with  internal component on his right ear.     EVALUATION  Otoscopy revealed clear ear canals and tympanic membrane visualized, bilaterally.    Set feedback manager and no feedback was observed.  Programming was completed at 100% of proprietary fitting strategy.     Hearing aid verification fit and function was validated by the patient and confirmed to be satisfactory.     Hearing aid was paired to iPhone in settings (accessibility--> hearing devices) and to Cochlear TV streamer.     Patient requested custom tip for his hearing aid. Impression was taken without incident. To be charged requisite $100 at check out.     **NOTE: ReSound software unable to save settings to database. Hearing aid adjustments and fine tuning saved to devices. Please use settings from devices upon next appointment.     IMPRESSIONS:  Today's 1-hour hearing aid fitting appointment was spent discussing use, care, and maintenance of the hearing device. The patient was able to properly insert and remove the hearing instrument from the ear.  In addition to today's verbal instruction of the hearing devices, the patient was given written instructions from the hearing aid .  Hearing aid limitations were discussed at length as well as realistic expectations.  The patient was advised in order to receive full benefit of amplification, consistent use during all waking hours is recommended. The repair warranty (coverage/cost from the hearing aid  and not the  responsibility of [facility]) and the conditions of the right-to-return period (30-days). Patient affirmed understanding of shipping process for custom tip and charges therein. He will pay at the  and make an appointment for two weeks for dispensing.     RECOMMENDATIONS:  Continue medical follow-up with physician.  Return for custom mold dispensing in 2 weeks.   Return for audiologic assessment in conjunction with otologic care or annually.     PATIENT EDUCATION:   Discussed results and recommendations with Ulysses Murguia.  Questions were addressed and the patient was encouraged to contact our department ([phone]) should concerns arise.      LEI Javier under the supervision of Abebe Hanks CCC-A    TIME: [id number]

## 2023-12-12 NOTE — PROGRESS NOTES
AUDIOLOGY ADULT HEARING AID FITTING    Name:  Ulysses Murguia  :  1955  Age:  68 y.o.  Date of Evaluation:  23  Time: 8512-2552      Ear Make and Model Serial Number /Tube size Dome Warranty Expiration   Left ReSound Nexia [id number]  length 1 Small power 2027       History:  Ulysses Murguia, age 68, was seen today for a fitting of a ReSound Nexia hearing aid on his left side. He is currently wearing a Cochlear Nucleus 8 processor in conjunction with a  internal component on his right and a loaner Phonak hearing aid on his left. He arrives to today's appointment ready to discuss potential compatibility between his two devices and pairing options for his Cochlear accessories. Ulysses expressed interest in a custom mold.       Procedure:   - Fit ReSound device to updated audiogram in [employer] software.   - Increased all frequencies two units and increased 125- 1500 Hz two clicks.   - Paired device to Cochlear TV streamer.   - Ear mold impression taken without incident. Patient to pay requisite $100 at desk.     *NOTE: Due to current ReSound system wide error, programming was unable to be saved to the database. Upon next connection, please read settings from devices, not software.     Topics discussed:   - Use of the  and maintaining  integrity.  - Daily care and maintenance of the devices (changing domes, wax filters, etc.).  - Volume changes and device options.  - Connectivity with iPhone.  - Available programs and program changes.  - Pairing to Cochlear TV streamer.    Discussion:  Any patient questions were addressed. The patient was made aware that his custom ear mold may not be available before the end of the month, but was counseled to make an appointment for 2 weeks for a fitting of his mold. Should the mold not be available, the patient will be notified. Paid $100 at check out.     Treatment Plan:   - Return for ear mold fitting.   - Daily use of bilateral  hearing assistive technology.   - Retest hearing annually or sooner if concerns arise.   - Follow up with medical providers as indicated.       LEI Javier under the supervision of Abebe Hanks CCC-A

## 2023-12-13 ENCOUNTER — OFFICE VISIT (OUTPATIENT)
Dept: PRIMARY CARE | Facility: CLINIC | Age: 68
End: 2023-12-13
Payer: MEDICARE

## 2023-12-13 DIAGNOSIS — M35.3 POLYMYALGIA RHEUMATICA (MULTI): ICD-10-CM

## 2023-12-13 DIAGNOSIS — R03.0 BLOOD PRESSURE ELEVATED WITHOUT HISTORY OF HTN: ICD-10-CM

## 2023-12-13 DIAGNOSIS — R09.89 BRUIT OF RIGHT CAROTID ARTERY: Primary | ICD-10-CM

## 2023-12-13 DIAGNOSIS — Z00.00 HEALTH CARE MAINTENANCE: ICD-10-CM

## 2023-12-13 DIAGNOSIS — I10 PRIMARY HYPERTENSION: ICD-10-CM

## 2023-12-13 DIAGNOSIS — E78.2 COMBINED HYPERLIPIDEMIA: ICD-10-CM

## 2023-12-13 PROCEDURE — 1126F AMNT PAIN NOTED NONE PRSNT: CPT | Performed by: INTERNAL MEDICINE

## 2023-12-13 PROCEDURE — 99214 OFFICE O/P EST MOD 30 MIN: CPT | Performed by: INTERNAL MEDICINE

## 2023-12-13 PROCEDURE — 1159F MED LIST DOCD IN RCRD: CPT | Performed by: INTERNAL MEDICINE

## 2023-12-13 PROCEDURE — 1170F FXNL STATUS ASSESSED: CPT | Performed by: INTERNAL MEDICINE

## 2023-12-13 PROCEDURE — 1036F TOBACCO NON-USER: CPT | Performed by: INTERNAL MEDICINE

## 2023-12-13 PROCEDURE — G0439 PPPS, SUBSEQ VISIT: HCPCS | Performed by: INTERNAL MEDICINE

## 2023-12-13 PROCEDURE — 1160F RVW MEDS BY RX/DR IN RCRD: CPT | Performed by: INTERNAL MEDICINE

## 2023-12-13 NOTE — PROGRESS NOTES
Subjective   Patient ID: Ulysses Murguia is a 68 y.o. male who presents for No chief complaint on file..    HPI CPE see updated front sheet no chest pain no shortness of breath concerned about carotid bruit discussed with having ultrasound bowels normal no dysuria bones muscles joints otherwise okay    Past medical history PMR    Medications noted and unchanged    Allergies no known drug allergies    Social history no tobacco    Family history noted and unchanged    Prevention some exercise some prior blood work reviewed discussed with colonoscopy discussed with vaccinations    Depression screen not depressed    Review of Systems    Objective   There were no vitals taken for this visit.    Physical Exam  Vs noted bp 133/77 Alert and oriented x 3 NCAT PERRLA EOMI nares without discharge OP benign TM normal bilateral opaque EAC clear bilateral cochlear implant no AC nodes no JVD no significant change bruit right greater than left no no thyromegaly chest clear to auscultation and percussion CV regular rate and rhythm S1-S2 without murmur gallop or rub abdomen soft nontender normal active bowel sounds no rebound or guarding no HSM LS spine normal curvature negative straight leg raise negative logroll negative SI joint tenderness extremities no clubbing cyanosis or edema normal distal pulses DTR 2+  Assessment/Plan impression general medical examination PMR cochlear implant carotid bruit right Hyperlipidemia elevated blood pressure without hypertension/htn  Plan review prior laboratory results okay for ultrasound vascular lab requisition made concerning the bruit good diet regular exercise increase water consumption there is no active synovitis advised on prednisone regarding the PMR as well as the blood pressure and previous cholesterol results continue with bp and lipid therapy or change after scan continue with other medciation being status post COVID may have other symptomatology discussed with risk-benefit side  effects and follow-up of each individual symptoms good diet regular exercise increase water consumption follow up leaves for [facility] there while away and then recheck at any time    [id number] [id number]

## 2023-12-18 DIAGNOSIS — M35.3 POLYMYALGIA RHEUMATICA (MULTI): Primary | ICD-10-CM

## 2023-12-18 DIAGNOSIS — M35.3 POLYMYALGIA RHEUMATICA (MULTI): ICD-10-CM

## 2023-12-18 RX ORDER — METHOTREXATE 2.5 MG/1
10 TABLET ORAL
Qty: 24 TABLET | Refills: 1 | Status: SHIPPED | OUTPATIENT
Start: 2023-12-18 | End: 2023-12-18

## 2023-12-18 RX ORDER — FOLIC ACID 1 MG/1
1 TABLET ORAL DAILY
Qty: 30 TABLET | Refills: 11 | Status: SHIPPED | OUTPATIENT
Start: 2023-12-18 | End: 2024-01-25 | Stop reason: SDUPTHER

## 2023-12-18 RX ORDER — METHOTREXATE 2.5 MG/1
TABLET ORAL
Qty: 51 TABLET | Refills: 0 | Status: SHIPPED | OUTPATIENT
Start: 2023-12-18 | End: 2024-01-25 | Stop reason: SDUPTHER

## 2023-12-21 ENCOUNTER — HOSPITAL ENCOUNTER (OUTPATIENT)
Dept: VASCULAR MEDICINE | Facility: HOSPITAL | Age: 68
Discharge: HOME | End: 2023-12-21
Payer: MEDICARE

## 2023-12-21 ENCOUNTER — LAB (OUTPATIENT)
Dept: LAB | Facility: LAB | Age: 68
End: 2023-12-21
Payer: MEDICARE

## 2023-12-21 DIAGNOSIS — M35.3 POLYMYALGIA RHEUMATICA (MULTI): ICD-10-CM

## 2023-12-21 DIAGNOSIS — R09.89 BRUIT OF RIGHT CAROTID ARTERY: ICD-10-CM

## 2023-12-21 LAB
ALBUMIN SERPL BCP-MCNC: 4 G/DL (ref 3.4–5)
ALP SERPL-CCNC: 42 U/L (ref 33–136)
ALT SERPL W P-5'-P-CCNC: 36 U/L (ref 10–52)
ANION GAP SERPL CALC-SCNC: 11 MMOL/L (ref 10–20)
AST SERPL W P-5'-P-CCNC: 23 U/L (ref 9–39)
BILIRUB SERPL-MCNC: 0.6 MG/DL (ref 0–1.2)
BUN SERPL-MCNC: 23 MG/DL (ref 6–23)
CALCIUM SERPL-MCNC: 9.2 MG/DL (ref 8.6–10.3)
CHLORIDE SERPL-SCNC: 102 MMOL/L (ref 98–107)
CO2 SERPL-SCNC: 30 MMOL/L (ref 21–32)
CREAT SERPL-MCNC: 0.76 MG/DL (ref 0.5–1.3)
CRP SERPL-MCNC: 0.1 MG/DL
ERYTHROCYTE [DISTWIDTH] IN BLOOD BY AUTOMATED COUNT: 11.5 % (ref 11.5–14.5)
ERYTHROCYTE [SEDIMENTATION RATE] IN BLOOD BY WESTERGREN METHOD: <1 MM/H (ref 0–20)
GFR SERPL CREATININE-BSD FRML MDRD: >90 ML/MIN/1.73M*2
GLUCOSE SERPL-MCNC: 88 MG/DL (ref 74–99)
HCT VFR BLD AUTO: 45.2 % (ref 41–52)
HGB BLD-MCNC: 14.8 G/DL (ref 13.5–17.5)
MCH RBC QN AUTO: 32 PG (ref 26–34)
MCHC RBC AUTO-ENTMCNC: 32.7 G/DL (ref 32–36)
MCV RBC AUTO: 98 FL (ref 80–100)
NRBC BLD-RTO: 0 /100 WBCS (ref 0–0)
PLATELET # BLD AUTO: 240 X10*3/UL (ref 150–450)
POTASSIUM SERPL-SCNC: 4.5 MMOL/L (ref 3.5–5.3)
PROT SERPL-MCNC: 6.5 G/DL (ref 6.4–8.2)
RBC # BLD AUTO: 4.63 X10*6/UL (ref 4.5–5.9)
SODIUM SERPL-SCNC: 138 MMOL/L (ref 136–145)
WBC # BLD AUTO: 8.5 X10*3/UL (ref 4.4–11.3)

## 2023-12-21 PROCEDURE — 85652 RBC SED RATE AUTOMATED: CPT

## 2023-12-21 PROCEDURE — 80053 COMPREHEN METABOLIC PANEL: CPT

## 2023-12-21 PROCEDURE — 86140 C-REACTIVE PROTEIN: CPT

## 2023-12-21 PROCEDURE — 93880 EXTRACRANIAL BILAT STUDY: CPT | Performed by: SURGERY

## 2023-12-21 PROCEDURE — 85027 COMPLETE CBC AUTOMATED: CPT

## 2023-12-21 PROCEDURE — 93880 EXTRACRANIAL BILAT STUDY: CPT

## 2023-12-21 PROCEDURE — 36415 COLL VENOUS BLD VENIPUNCTURE: CPT

## 2023-12-22 ENCOUNTER — OFFICE VISIT (OUTPATIENT)
Dept: RHEUMATOLOGY | Facility: CLINIC | Age: 68
End: 2023-12-22
Payer: MEDICARE

## 2023-12-22 VITALS
SYSTOLIC BLOOD PRESSURE: 134 MMHG | WEIGHT: 199 LBS | HEART RATE: 73 BPM | TEMPERATURE: 98.1 F | BODY MASS INDEX: 25.9 KG/M2 | DIASTOLIC BLOOD PRESSURE: 69 MMHG

## 2023-12-22 DIAGNOSIS — M35.3 POLYMYALGIA RHEUMATICA (MULTI): ICD-10-CM

## 2023-12-22 DIAGNOSIS — M35.3 POLYMYALGIA RHEUMATICA (MULTI): Primary | ICD-10-CM

## 2023-12-22 PROCEDURE — 1036F TOBACCO NON-USER: CPT | Performed by: INTERNAL MEDICINE

## 2023-12-22 PROCEDURE — 99214 OFFICE O/P EST MOD 30 MIN: CPT | Performed by: INTERNAL MEDICINE

## 2023-12-22 PROCEDURE — 1160F RVW MEDS BY RX/DR IN RCRD: CPT | Performed by: INTERNAL MEDICINE

## 2023-12-22 PROCEDURE — 1159F MED LIST DOCD IN RCRD: CPT | Performed by: INTERNAL MEDICINE

## 2023-12-22 PROCEDURE — 1126F AMNT PAIN NOTED NONE PRSNT: CPT | Performed by: INTERNAL MEDICINE

## 2023-12-22 RX ORDER — PREDNISONE 1 MG/1
TABLET ORAL
Qty: 210 TABLET | Refills: 0 | Status: SHIPPED | OUTPATIENT
Start: 2023-12-22

## 2023-12-22 ASSESSMENT — PAIN SCALES - GENERAL: PAINLEVEL: 0-NO PAIN

## 2023-12-22 NOTE — PROGRESS NOTES
Subjective   Patient ID: Ulysses Murguia is a 68 y.o. male who presents for Follow-up.    HPI  This is a 67 yo male, is here as a new patient with the chief complain of bilateral proximal lower extremity pain and weakness for 3 years.   Patient says that he started having pain and weakness in his thighs about 2-3 years ago. He had trouble getting up from a chair, getting up from the toilet seat. He was first seen by an orthopedic surgeon and initially thought his symptoms were due to swollen knees. But after doing x-rays of his knees he was told that his symptoms are not due to knee problem, but most likely due to PMR. Then he was seen by a nurse practitioner around 02/2021 and started him on prednisone course (15mgx 5d, 10mgx 5d, 5mgx 5d)for which he responded fast. He felt better the next day. Once he stopped prednisone he did ok for a while. But around spring of 2022 his symptoms came back and this time it was severe. He was seen by multiple rheumatologist at [facility] and was told he does not have PMR and was sent home. Around 01/2023 his symptoms got worsen to the point he couldn't again get up from a seated position. He was prescribed a course of prednisone by a general practitioner at [place]. HE was on prednisone 15mg for few days, then 10mg for few days and then stayed for 5mg for few weeks. But while on prednisone 5mg around 04/2023 his symptoms got worse. So he himself increased the dose to 15mg for 5 days and now on 10mg for the last 3 days.  He denies any significant pain or weakness in his upper extremities. Denies any headaches, blurry vision, jaw pain, numbness or tingling. Denies any joint pain or swelling. AM stiffness lasts for one hour. He also c/o trouble opening jars.   He has a hx of fall in the boat and had a fracture in his lumbar spine in the past.   He had extensive blood work in the past which showed normal ESR, CPK, AST, ALT, aldolase. He only had one positive CRP of 1.6 on 10/2022, rest of the  labs were normal. Hs has been on a statin since 10/2022.      07/23  When he tapered his PRD to 10 mg, his symptoms came back. He called me and we increased his PRD dose to 12.5 mg daily.  Today, he is doing better. He reports only mild thigh pain, but no stiffness.  He denies any cranial symptoms, visual problem.     PMHx: HTN, HLD, hearing loss, hx of thrombophlebitis and PE  Allergies: NKDA  Family hx: no family hx of autoimmune diseses  Social hx: denies smoking, alcohol or other recreational drugs, wife is a retired anesthesiologist      Interval history:  In 10/23, he tapered his PRD dose to 8 mg daily, but he had a COVID-19 infection in November and then he had a flare with hip, shoulder pain and AM stiffness. We increased his PRD dose to 15 mg daily and his symptoms improved.  Today, he is doing well, but needs to use 15 mg PRD.    ROS  Joint pain in hands: negative  Joint swelling: negative  Morning stiffness and duration: negative   strength: normal  Oral ulcer: negative  Genital ulcer: negative  Raynaud phenomenon: negative  Chest pain/dyspnea: negative  Low back pain: negative  Visual problem: negative  Dry eyes/dry mouth: negative  Skin rash/scaling/psoriasis: negative       Objective     PEXAM  VS reviewed, WNL  General: Alert, no distress   HEENT: Normocephalic/atraumatic, No alopecia. PERRLA. Sclera white, conjunctiva pink, no malar rash. no oral or nasal ulcer. Oral cavity pink and moist, no erythema or exudate, dentition good.   Neck: supple  Respiratory: CTA B, no adventitious breath sounds  Cardiac: RRR, no murmurs, carotid, or bruits  Abdominal: symmetrical, soft, non-tender, non-distended, normoactive BSx4 quadrants, no CVA tenderness or suprapubic tenderness  MSK: Joints of upper and lower extremities were assessed for synovitis and ROM.    Today she has no evidence of synovitis in the joints of her hands or wrists, tender joint count 0, swollen joint count 0   Extremities: no clubbing, no  cyanosis, no edema  Skin: Skin warm and moist.   Neuro: non-focal, Strength 5/5 throughout. Normal gait. No cerebellar pathologic exam     Assessment/Plan   This is a 69 yo male, is here as a new patient with the pain and weakness in bilateral proximal lower extremity for 2-3 years.      Assessment:  He has some positive symptoms and signs suggestive of PMR including pain in thighs responding to prednisone, one time elevated CRP in 10/2022. But he also has some atypical symptoms. He has weakness which is not very typical for PMR and his ESR, CRP, CPK has been normal most of the times which argues against PMR.  He does not have any GCA symptoms.      He labs showed normal ESR, CPK, AST, ALT, aldolase. He only had one positive CRP of 1.6 on 10/2022, rest of the labs were normal. Hs has been on a statin since 10/2022.     At this time his diagnosis is unclear. But because his symptoms are responding to prednisone we will treat empirically for possible PMR with longer prednisone taper. He stopped statin treatment due to possible statin associated myalgia. But, he has not noted any changes.   Overall, he was diagnosed with PMR before and steroid significantly helped his pain. Then, he stopped PRD and he had severe symptoms, he started PRD again. We saw him and tried steroid tapering, but could not tolerate tapering after 12.5 mg.  His PMR is atypical, it presented with lower extremity proximal symptoms and knee pain, swelling, no upper extremity symptoms, cranial symptoms or peripheral arthritis, classical morning stiffness. Also, his acute phases were normal. Overall, his PMR diagnosis is suspicious, but responded steroid before and we decided to continue steroid and try tapering down. In 10/23, he tapered down his PRD to 8 mg daily, but he had a COVID-19 infection in 11/23 and his symptoms flared. We increased his PRD dose to 15 mg and his hip, shoulder pain, stiffness improved.  Recent ESR, cRP were NL.    -will try  PRD tapering again  -DEXA NL, using calcium and vit D  -will call us if he has any problem  -will see him in 3-4 months

## 2023-12-29 ENCOUNTER — CLINICAL SUPPORT (OUTPATIENT)
Dept: AUDIOLOGY | Facility: CLINIC | Age: 68
End: 2023-12-29

## 2023-12-29 DIAGNOSIS — H90.3 ASYMMETRIC SNHL (SENSORINEURAL HEARING LOSS): Primary | ICD-10-CM

## 2023-12-29 PROCEDURE — V5299 HEARING SERVICE: HCPCS | Performed by: AUDIOLOGIST

## 2023-12-29 ASSESSMENT — PAIN - FUNCTIONAL ASSESSMENT: PAIN_FUNCTIONAL_ASSESSMENT: 0-10

## 2023-12-29 ASSESSMENT — PAIN SCALES - GENERAL: PAINLEVEL_OUTOF10: 0 - NO PAIN

## 2023-12-29 NOTE — PROGRESS NOTES
Mr. Murguia was seen today to fit his custom left earmold.  It fit well and he felt that it was comfortable.  He also stated that his mini ericka was not working with the hearing aid.  I paired the ericka with his hearing aid and showed him how to connect with it once he wants to use it.  He has to turn on the mini ericka and then long hold the button on his hearing aid until he hears the trill sound.  It will then be connected to his Mini ericka.  We practiced this in office.      Left earmold is under warranty until 3/15/2024    Treatment Plan:   Continued follow up with Dr. Quinteros for any hearing aid and CI programming.    Annual hearing evaluations.     Time: [id number]

## 2023-12-30 ASSESSMENT — ENCOUNTER SYMPTOMS
DEPRESSION: 0
LOSS OF SENSATION IN FEET: 0
OCCASIONAL FEELINGS OF UNSTEADINESS: 0

## 2023-12-30 ASSESSMENT — ACTIVITIES OF DAILY LIVING (ADL)
GROCERY_SHOPPING: INDEPENDENT
MANAGING_FINANCES: INDEPENDENT
TAKING_MEDICATION: INDEPENDENT
TAKING_MEDICATION: INDEPENDENT
DRESSING: INDEPENDENT
DOING_HOUSEWORK: INDEPENDENT
MANAGING_FINANCES: INDEPENDENT
BATHING: INDEPENDENT
GROCERY_SHOPPING: INDEPENDENT
DRESSING: INDEPENDENT
BATHING: INDEPENDENT
DOING_HOUSEWORK: INDEPENDENT

## 2023-12-30 ASSESSMENT — PATIENT HEALTH QUESTIONNAIRE - PHQ9
2. FEELING DOWN, DEPRESSED OR HOPELESS: NOT AT ALL
SUM OF ALL RESPONSES TO PHQ9 QUESTIONS 1 AND 2: 0
1. LITTLE INTEREST OR PLEASURE IN DOING THINGS: NOT AT ALL
1. LITTLE INTEREST OR PLEASURE IN DOING THINGS: NOT AT ALL
SUM OF ALL RESPONSES TO PHQ9 QUESTIONS 1 AND 2: 0
2. FEELING DOWN, DEPRESSED OR HOPELESS: NOT AT ALL

## 2024-01-23 ENCOUNTER — TELEMEDICINE (OUTPATIENT)
Dept: OTOLARYNGOLOGY | Facility: CLINIC | Age: 69
End: 2024-01-23
Payer: MEDICARE

## 2024-01-23 DIAGNOSIS — H83.8X1 SUPERIOR SEMICIRCULAR CANAL DEHISCENCE OF RIGHT EAR: Primary | ICD-10-CM

## 2024-01-23 DIAGNOSIS — H90.3 ASYMMETRIC SNHL (SENSORINEURAL HEARING LOSS): ICD-10-CM

## 2024-01-23 DIAGNOSIS — H90.A31 MIXED CONDUCTIVE AND SENSORINEURAL HEARING LOSS OF RIGHT EAR WITH RESTRICTED HEARING OF LEFT EAR: ICD-10-CM

## 2024-01-23 DIAGNOSIS — Z96.21 COCHLEAR IMPLANT STATUS: ICD-10-CM

## 2024-01-23 PROCEDURE — 99024 POSTOP FOLLOW-UP VISIT: CPT | Performed by: OTOLARYNGOLOGY

## 2024-01-23 NOTE — PROGRESS NOTES
"An interactive audio and video telecommunication system which permits real time communications between the patient (at the originating site) and provider (at the distant site) was utilized to provide this telehealth service.  Verbal consent was requested and obtained for a telehealth visit. Patient was out of [place] during the visit and thus this is a no charge visit    History Of Present Illness:  Ulysess Murguia is a 68 y.o. male with a history of bilateral sensorineural hearing loss, right mixed hearing loss, and right SSCD, he is s/p right transmastoid plugging of the round window and right cochlear implantation on 9/18/23. He's doing \"okay\", overall he feels the surgery went well. There was an issue regarding his left-sided hearing aid, he has a resound but his current model doesn't yet pair with the cochlear implant. There is a new model of his hearing aid coming out that should work with the CI, but it's not out yet. He feels that he may not be making as much progress as he should as a result. His phone won't pair properly with his cochlear implant, it does however pair with his hearing aid.     Recall 10/17/23:   Ulysses Murguia is a 68 y.o. male with a history of bilateral sensorineural hearing loss, right mixed hearing loss, and right SSCD, he is s/p right transmastoid plugging of the round window and right cochlear implantation on 9/18/23. He is here for his first post operative appointment. No major issues after surgery. He reports significant improvement in autophony. His hearing actually feels better on the right than pre op. No dizziness - he did have a fall while working in the yard and balancing on a beam however he did not have a vertiginous episode at this time. He is undergoing activation today.    Surgical History:  He has no past surgical history on file.     Allergies:  Patient has no known allergies.    Medications:   Current Outpatient Medications   Medication Instructions    atorvastatin " (LIPITOR) 40 mg, oral, Daily, TAKE PER DIRECTED    citalopram (CELEXA) 20 mg, oral, Daily, TAKE PER DIRECTED    folic acid (FOLVITE) 1 mg, oral, Daily    lisinopril 10 mg, oral, Daily, TAKE PER DIRECTED    methotrexate (Trexall) 2.5 mg tablet TAKE 4 TABLETS(10MG TOTAL) BY MOUTH ONE TIME PER WEEK. FOLLOW DIRECTIONS AND ASK QUESTIONS IF NEEDED.    omeprazole (PRILOSEC) 40 mg, oral, Do not crush or chew.  TAKE PER DIRECTED    predniSONE (Deltasone) 1 mg tablet TAKE 7 TABLETS ONCE DAILY    predniSONE (DELTASONE) 5 mg, oral, Daily    tadalafil (CIALIS) 5 mg, oral, As needed      Review of Systems:   A comprehensive 10-point review of systems was obtained including constitutional, neurological, HEENT, pulmonary, cardiovascular, genito-urinary, and other pertinent systems and was negative except as noted in the HPI.      Physical Exam:  Constitutional   General appearance: Healthy-appearing, well-nourished, well groomed, in no acute distress.   Ability to communicate: Normal communication without aids, normal voice quality.       Head and face: Atraumatic with no masses, lesions, or scarring.   Facial strength: Normal strength and symmetry, no synkinesis or facial tic.     Ears  Otoscopic examination: Bilateral normal otoscopy of the tympanic membrane     Nose: Dorsum symmetric with no visible or palpable deformities.     Oral Cavity/Mouth  Lips, teeth, and gums: Normal lips, gums, and dentition.     Oropharynx: Mucosa moist, no lesions.     Neck: Symmetrical, trachea midline. No masses visible.     Neurological/Psychiatric  Cranial Nerve Examination: II - XII grossly intact.  Orientation to person, place, and time: Normal.  Mood and affect: Normal.     Skin: Normal without rashes or lesions.     Pulmonary  Respiratory effort: Chest expands symmetrically.     Cardiovascular: Good peripheral pulses  Peripheral vascular system: No varicosities, carotid pulse normal, no edema. No jugular venous distension.     Extremities:  Appearance of extremities: Normal. Gait normal.     Last Recorded Vitals:  There were no vitals taken for this visit.     Assessment/Plan   68 y.o. male with a history of bilateral sensorineural hearing loss, right mixed hearing loss, and right SSCD, he is s/p right transmastoid plugging of the round window and right cochlear implantation on 9/18/23. He's doing well since surgery. He has had a problem with his left-sided hearing aid, as his current model doesn't pair with his CI, there's a new model due to come out in the future that does pair. He's also had problems pairing his phone with his CI, so he's been unable to adjust the volume on it. He doesn't feel he's making as much progress as he should, I do think it's imperative though that the blue tooth issues are resolved as soon as possible, will have Ivette Wright RN, reach out to him regarding this. Once he has worked out the technical issues with his cochlear implant, I advised him to take his left hearing aid out for 1-2 hours daily so he can listen with just the cochlear implant.     - Have Ivette Wright RN, reach out regarding blue tooth issues and to figure out how to pair his hearing aid with his cochlear implant   - RTC in 1 year      Scribe Attestation:  By signing my name below, I, Sarah Raimrez , Scribe   attest that this documentation has been prepared under the direction and in the presence of Tabatha Pelayo MD.    I have reviewed the documentation as scribed by Sarah Ramirez and agree with the notes.   Tabatha Pelayo MD

## 2024-01-23 NOTE — LETTER
January 24, 2024       No Recipients    Patient: Ulysses Murguia   YOB: 1955   Date of Visit: 1/23/2024       Dear Dr. Christine Recipients:    I had the pleasure of seeing your patient, Ulysses Murguia today. Below are my notes for this consultation.  If you have questions, please do not hesitate to call me. Thank you for allowing me to participate in the care of your patient.        Sincerely,     Tabatha Pelayo MD      CC:   No Recipients  _____________________________________________________________________________    68 y.o. male with a history of bilateral sensorineural hearing loss, right mixed hearing loss, and right SSCD, he is s/p right transmastoid plugging of the round window and right cochlear implantation on 9/18/23. He's doing well since surgery. He has had a problem with his left-sided hearing aid, as his current model doesn't pair with his CI, there's a new model due to come out in the future that does pair. He's also had problems pairing his phone with his CI, so he's been unable to adjust the volume on it. He doesn't feel he's making as much progress as he should, I do think it's imperative though that the blue tooth issues are resolved as soon as possible, will have Ivette Wright RN, reach out to him regarding this. Once he has worked out the technical issues with his cochlear implant, I advised him to take his left hearing aid out for 1-2 hours daily so he can listen with just the cochlear implant.     - Have Ivette Wright RN, reach out regarding blue tooth issues and to figure out how to pair his hearing aid with his cochlear implant   - RTC in 1 year

## 2024-01-24 ENCOUNTER — TELEMEDICINE (OUTPATIENT)
Dept: NEUROLOGY | Facility: HOSPITAL | Age: 69
End: 2024-01-24
Payer: MEDICARE

## 2024-01-24 DIAGNOSIS — R52 PAIN: Primary | ICD-10-CM

## 2024-01-24 PROCEDURE — 99215 OFFICE O/P EST HI 40 MIN: CPT | Performed by: PSYCHIATRY & NEUROLOGY

## 2024-01-24 PROCEDURE — 99215 OFFICE O/P EST HI 40 MIN: CPT | Mod: GT,95 | Performed by: PSYCHIATRY & NEUROLOGY

## 2024-01-24 NOTE — PROGRESS NOTES
Entered in Error. Please refer to other note dated today for full HPI and Plan   No indicators present

## 2024-01-24 NOTE — PROGRESS NOTES
Virtual or Telephone Consent    An interactive audio and video telecommunication system which permits real time communications between the patient (at the originating site) and provider (at the distant site) was utilized to provide this telehealth service.     Verbal consent was requested and obtained from Ulysses Murguia on this date, 01/24/24 for a telehealth visit.      Date of Service: 1/24/2024  Patient: Ulysses Murguia  MRN: [id number]  Referring Provider: No ref. provider found  Primary Care Physician: Al Cummings MD     History of Present Illness:    Mr. Murguia is a 68 y.o. male who presents for follow up of episodic proximal leg/thigh pain, generalized fatigue and lethargy, that could be atypical PMR. These symptoms respond robustly to steroids, supporting an autoimmune etiology.     He got COVID in early November 2023. He took Paxlovid. Following the COVID infeciton, his pain symptoms got worse. His rheumatologist increased dose of prednisone and methotrexate 10 mg weekly. This past week he was reduced down from 12.5 mg to 10 mg prednisone and his symptoms feel worse this week. He has 5 mg tablets and some 1 mg tablets of prednisone.     He has not alerted his rheumatologist yet regarding his recent worsening.     He was doing well on a lower dose of prednisone before COVID.     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        Prior History:   Was seen initially in Sept 2023; has reported since late 40s, episodes of fatigue and lethargy annd generalized weakness, severe muscle aching in his thighs, weakness in this setting, initially would occur once a year, then 2-3 times a year, and more recently about 75% of the year.     His symptoms robustly respond to steroids, making a genetic etiology less likely.     Problem List Items Addressed This Visit    None      No Known Allergies     Medications:    Current Outpatient Medications:     atorvastatin (Lipitor) 40 mg tablet, Take 1 tablet (40 mg) by  "mouth once daily. TAKE PER DIRECTED, Disp: , Rfl:     citalopram (CeleXA) 20 mg tablet, Take 1 tablet (20 mg) by mouth once daily. TAKE PER DIRECTED, Disp: , Rfl:     folic acid (Folvite) 1 mg tablet, Take 1 tablet (1 mg) by mouth once daily., Disp: 30 tablet, Rfl: 11    lisinopril 10 mg tablet, Take 1 tablet (10 mg) by mouth once daily. TAKE PER DIRECTED, Disp: , Rfl:     methotrexate (Trexall) 2.5 mg tablet, TAKE 4 TABLETS(10MG TOTAL) BY MOUTH ONE TIME PER WEEK. FOLLOW DIRECTIONS AND ASK QUESTIONS IF NEEDED., Disp: 51 tablet, Rfl: 0    omeprazole (PriLOSEC) 40 mg DR capsule, Take 1 capsule (40 mg) by mouth. Do not crush or chew.  TAKE PER DIRECTED, Disp: , Rfl:     predniSONE (Deltasone) 1 mg tablet, TAKE 7 TABLETS ONCE DAILY, Disp: 210 tablet, Rfl: 0    predniSONE (Deltasone) 5 mg tablet, Take 1 tablet (5 mg) by mouth once daily., Disp: 60 tablet, Rfl: 1    tadalafil (Cialis) 5 mg tablet, Take 1 tablet (5 mg) by mouth if needed for erectile dysfunction., Disp: , Rfl:      Physical Exam:     There were no vitals taken for this visit.    Brief Neurological Exam:  Alert an awake, resting in seated position. Face symmetric.       Results:       Lab Results   Component Value Date    CKTOTAL 139 07/28/2023     No results found for: \"SPEP\"  CBC:   Lab Results   Component Value Date    WBC 8.5 12/21/2023    HGB 14.8 12/21/2023    HCT 45.2 12/21/2023     12/21/2023     BMP:   Lab Results   Component Value Date     12/21/2023    K 4.5 12/21/2023     12/21/2023    CO2 30 12/21/2023    BUN 23 12/21/2023    CREATININE 0.76 12/21/2023    CALCIUM 9.2 12/21/2023     LFT:   Lab Results   Component Value Date    ALKPHOS 42 12/21/2023    BILITOT 0.6 12/21/2023    PROT 6.5 12/21/2023    ALBUMIN 4.0 12/21/2023    ALT 36 12/21/2023    AST 23 12/21/2023       Impression/Plan:     Impression:  Ulysses Murguia is a 68 y.o. who presents with over 20 year history of episodes of generalized fatigue, lethargy, aching pain " especially in proximal legs, that over the years has increased in frequency from once a year to now 75% of the year. His symptoms respond robustly to steroids and he follows with rheumatology. On the differential has been atypical PMR by rheumatology.    When seen during his initial visit and today--no clear nerve or muscle etiology comes up as fitting with his episodic symptoms. We agree that he must have an autoimmune condition as he responds robustly to steroids. He is also on methotrexate 10 mg weekly.    He was on prednisone 12.5 mg daily and in the last week tapered down to 10 mg daily. He feels his symptoms worsened with tapering down to 10 mg daily. He has not alerted his rheumatologist yet.     CK has been normal and ESR in the past.     Plan:  -- unusual presentation, agree likely autoimmune  -- did discuss genetic muscle disease testing at prior visit, though our suspicion for this is low given we would not expect these symptoms and also would not expect improvement with steroids. We also considered if muscle biopsy might add value--we think without an entity that would fit, the yield of this would be low.  -- will continue to brainstorm other differentials  -- I sent message to rheumatologist and asked patient to contact them too regarding worsening of symptoms with tapering down prednisone from 12.5 mg to 10 mg daily. Also on methotrexate 10 mg weekly.    Reviewed and approved by FLAVIO PEREIRA on 1/24/24 at 10:45 AM.    I personally spent 40 minutes on the day of the visit completing the review of the medical record and outside records, obtaining history and performing an appropriate physical exam, patient care, counseling and education,  communicating with the patient/family and other providers, coordinating care and performing appropriate clinical documentation.

## 2024-01-25 DIAGNOSIS — M35.3 POLYMYALGIA RHEUMATICA (MULTI): ICD-10-CM

## 2024-01-25 RX ORDER — FOLIC ACID 1 MG/1
1 TABLET ORAL DAILY
Qty: 30 TABLET | Refills: 11 | Status: SHIPPED | OUTPATIENT
Start: 2024-01-25 | End: 2025-01-24

## 2024-01-25 RX ORDER — METHOTREXATE 2.5 MG/1
17.5 TABLET ORAL
Qty: 56 TABLET | Refills: 0 | Status: SHIPPED | OUTPATIENT
Start: 2024-01-25 | End: 2024-01-26

## 2024-01-25 RX ORDER — PREDNISONE 5 MG/1
5 TABLET ORAL DAILY
Qty: 60 TABLET | Refills: 0 | Status: SHIPPED | OUTPATIENT
Start: 2024-01-25 | End: 2024-01-26

## 2024-01-26 RX ORDER — METHOTREXATE 2.5 MG/1
TABLET ORAL
Qty: 90 TABLET | Refills: 1 | Status: SHIPPED | OUTPATIENT
Start: 2024-01-26 | End: 2024-03-26

## 2024-01-26 RX ORDER — PREDNISONE 5 MG/1
12.5 TABLET ORAL DAILY
Qty: 455 TABLET | Refills: 0 | Status: SHIPPED | OUTPATIENT
Start: 2024-01-26 | End: 2024-05-14

## 2024-04-10 ENCOUNTER — CLINICAL SUPPORT (OUTPATIENT)
Dept: AUDIOLOGY | Facility: CLINIC | Age: 69
End: 2024-04-10
Payer: MEDICARE

## 2024-04-10 DIAGNOSIS — Z96.21 COCHLEAR IMPLANT IN PLACE: ICD-10-CM

## 2024-04-10 DIAGNOSIS — H90.3 SENSORINEURAL HEARING LOSS (SNHL) OF BOTH EARS: Primary | ICD-10-CM

## 2024-04-10 PROCEDURE — 92604 REPROGRAM COCHLEAR IMPLT 7/>: CPT | Performed by: AUDIOLOGIST

## 2024-04-10 NOTE — PROGRESS NOTES
Cochlear Implant Programming and Hearing Aid Check    Name:  Ulysses Murguia  :  1955  Age:  68 y.o.  Date of Evaluation:  04/10/2024  Time: 6623-4156        Ear Make and Model Serial Number /Tube size Dome Warranty Expiration   Left ReSound Nexia [id number]  length 1 Small power 2027     RIGHT DEVICE  External Device: Cochlear [id number] (N8) sound processor with acoustic component  Internal Device: Cochlear [employer]   Surgeon: Tabatha Pelayo MD  Implantation Date: 2023  LEFT DEVICE  Resound Nexia #[id number] with custom earmold  length 1  Warranty expires 2027    HISTORY  Ulysses Murguia arrives today for a left hearing aid check of his Resound Nexia MAGNUS device as well as programming and optimization of the right cochlear implant used in conjunction with a Cochlear [id number] (N8) sound processor (2015). The patient reports slow improvement in clarity of right imlpant. He reports weak response from left hearing aid for the past month. It will only work if he streams from his phone or Mini "[employer]", suggesting the  is functioning and the hearing aid itself is malfunctioning.  He cited following along to audio books  as his primary listening practice. Recall, Mr. Murguia had previously utilized his loss and damage claim after misplacing his right N8 processor. He then subsequently found the processor in his shed. He is aware there is no longer a valid warranty on this device ([id number]).     Recall, Ulysses Murguia has a documented history of severe to profound sensorineural hearing loss, bilaterally.    PROGRAMMING  Headset pressure, magnet strength (4I), and incision site were checked with no problems noted.  A length 2 acoustic  remains attached to the N8 processor.       Electrode impedances, used to monitor internal device function, were measured across the electrode array.  Impedance measures were within normal limits for all electrodes, in each stimulation  mode. Current parameters of MP1+2 stimulation mode, an ACE speech processing strategy, 8 maxima, and a 900 Hz stimulation rate were maintained.  Threshold (T) levels were measuring using up-down bracketing method on a variety of channels with the remaining channels interpolated. Comfort (C) levels were measuring using psychophysical loudness scaling method on a variety of channels with the remaining channels interpolated. T- and C- levels were globally decreased 5 units per patient request. No facial stimulation or non-auditory stimulation observed during live speech. Current programming consists of:    Program 1 - SCAN 2  Program 2 - SCAN 2 w/ Forward Focus  Program 3 - Restaurant  Program 4 - Groups    Datalogging of CI = 13+ hours/day mostly in P4 Groups, then P1 and P2; mobile use reflected    Hearing aid listening check revealed no amplification. Hearing aid will be sent for in warranty repair. Clinic loaner Omnia device programmed and dispensed for use during repair process. Custom earmold in use. When repaired hearing aid is in, patient would like to adjust noise filter, wind management, etc features in the All-Around program, to avoid the need to manually adjust these to preferred settings every day.    Datalogging of hearing aid = 14 hrs/day 80% in P1 All-Around setting, 20% in P2 Noise setting    IMPRESSIONS  Positive stimulation on all active electrodes.  A reliable psychophysical MAP was defined in the ACE speech processing strategy. Clinic loaner Resound Omnia programmed for left ear, for use while patient's Resound Nexia goes in for repair. Upon return, will link hearing aid and CI processor for bimodal capabilities.      RECOMMENDATIONS  1. Continue medical follow-up with your neuro-otologist Dr. Pelayo.  2. Utilize the Cochlear [employer] Cochlear [id number] (N8) sound processor daily using the most tolerated program.  3. Return annually for programming, optimization, and functional testing.   4.  Utilize aural rehabilitation/auditory training programs, books on tape, and written materials at home to improve speech understanding ability. Time should be spent in right CI only condition for rehab.

## 2024-04-17 ENCOUNTER — CLINICAL SUPPORT (OUTPATIENT)
Dept: AUDIOLOGY | Facility: CLINIC | Age: 69
End: 2024-04-17
Payer: MEDICARE

## 2024-04-17 DIAGNOSIS — H90.3 SENSORINEURAL HEARING LOSS (SNHL) OF BOTH EARS: Primary | ICD-10-CM

## 2024-04-17 DIAGNOSIS — Z96.21 COCHLEAR IMPLANT IN PLACE: ICD-10-CM

## 2024-04-17 PROCEDURE — 92604 REPROGRAM COCHLEAR IMPLT 7/>: CPT | Performed by: AUDIOLOGIST

## 2024-04-17 NOTE — PROGRESS NOTES
Cochlear Implant Programming and Hearing Aid Check    Name:  Ulysses Murguia  :  1955  Age:  68 y.o.  Date of Evaluation:  2024  Time: 830-920        Ear Make and Model Serial Number /Tube size Dome Warranty Expiration   Left ReSound Nexia [id number]  length 1 Small power 2027     RIGHT DEVICE  External Device: Cochlear [id number] (N8) sound processor with acoustic component  Internal Device: Cochlear Medefy   Surgeon: Tabatha Pelayo MD  Implantation Date: 2023  LEFT DEVICE  Resound Nexia #[id number] with custom earmold  length 1  Warranty expires 2027    HISTORY  Ulysses Murguia arrives today for a left hearing aid check of his Resound Nexia MAGNUS device as well as programming and optimization of the right cochlear implant used in conjunction with a Cochlear [id number] (N8) sound processor (2015). The patient reports slow improvement in clarity of right implant. We sent the Nexia MAGNUS hearing aid in for repair last week, and he returns today to dispense the repaired hearing aid and link/pair all of his devices.  He cited following along to audio books  as his primary listening practice. Recall, Mr. Murguia had previously utilized his loss and damage claim after misplacing his right N8 processor. He then subsequently found the processor in his shed. He is aware there is no longer a valid warranty on this device ([id number]).     Recall, Ulysses Murguia has a documented history of severe to profound sensorineural hearing loss, bilaterally.    PROGRAMMING  Headset pressure, magnet strength (4I), and incision site were checked with no problems noted.  A length 2 acoustic  remains attached to the N8 processor.       Electrode impedances, used to monitor internal device function, were measured across the electrode array.  Impedance measures were within normal limits for all electrodes, in each stimulation mode. Current parameters of MP1+2 stimulation mode, an ACE speech  processing strategy, 8 maxima, and a 900 Hz stimulation rate were maintained.  Pulse width was increased from 25 to 37 today due to compliance limits. T- and C- levels were globally adjusted accordingly. No facial stimulation or non-auditory stimulation observed during live speech. Current programming consists of:    Program 1 - SCAN 2  Program 2 - SCAN 2 w/ Forward Focus  Program 3 - Restaurant  Program 4 - Groups    Datalogging of CI = 13+ hours/day mostly in P1 SCAN    Hearing aid listening check satisfactory. Hearing aid was linked to the N8 processor in CustomSound. Hearing aid and N8 processor were paired to patient's iPhone, Mini Romulo and TV Streamer. He will pair the devices to his [place] home TV Streamer into the third slot when he is there next.  Remote control could not pair to the processor today. This will be replaced via in-warranty RMA and mailed directly to recipient.     IMPRESSIONS  Positive stimulation on all active electrodes.  A reliable psychophysical MAP was defined in the ACE speech processing strategy. Clinic loaner Resound Omnia returned.     RECOMMENDATIONS  1. Continue medical follow-up with your neuro-otologist Dr. Pelayo.  2. Utilize the Cochlear [employer] Nucleus Cochlear [id number] (N8) sound processor daily using the most tolerated program.  3. Return in 6 months for programming, optimization, and functional testing.   4. Utilize aural rehabilitation/auditory training programs, books on tape, and written materials at home to improve speech understanding ability. Time should be spent in right CI only condition for rehab.    [phone]

## 2024-04-24 ENCOUNTER — OFFICE VISIT (OUTPATIENT)
Dept: RHEUMATOLOGY | Facility: CLINIC | Age: 69
End: 2024-04-24
Payer: MEDICARE

## 2024-04-24 VITALS
HEART RATE: 58 BPM | SYSTOLIC BLOOD PRESSURE: 113 MMHG | HEIGHT: 74 IN | BODY MASS INDEX: 24.9 KG/M2 | DIASTOLIC BLOOD PRESSURE: 59 MMHG | TEMPERATURE: 97.3 F | WEIGHT: 194 LBS

## 2024-04-24 DIAGNOSIS — M35.3 POLYMYALGIA RHEUMATICA (MULTI): Primary | ICD-10-CM

## 2024-04-24 DIAGNOSIS — M35.3 PMR (POLYMYALGIA RHEUMATICA) (MULTI): Primary | ICD-10-CM

## 2024-04-24 PROCEDURE — 1126F AMNT PAIN NOTED NONE PRSNT: CPT | Performed by: INTERNAL MEDICINE

## 2024-04-24 PROCEDURE — 1159F MED LIST DOCD IN RCRD: CPT | Performed by: INTERNAL MEDICINE

## 2024-04-24 PROCEDURE — 99214 OFFICE O/P EST MOD 30 MIN: CPT | Performed by: INTERNAL MEDICINE

## 2024-04-24 ASSESSMENT — PAIN SCALES - GENERAL: PAINLEVEL: 0-NO PAIN

## 2024-04-24 NOTE — PROGRESS NOTES
Subjective   Patient ID: Ulysses Murguia is a 69 y.o. male who presents for Follow-up.    HPI  This is a 68 yo male, is here as a new patient with the chief complain of bilateral proximal lower extremity pain and weakness for 3 years.   Patient says that he started having pain and weakness in his thighs about 2-3 years ago. He had trouble getting up from a chair, getting up from the toilet seat. He was first seen by an orthopedic surgeon and initially thought his symptoms were due to swollen knees. But after doing x-rays of his knees he was told that his symptoms are not due to knee problem, but most likely due to PMR. Then he was seen by a nurse practitioner around 02/2021 and started him on prednisone course (15mgx 5d, 10mgx 5d, 5mgx 5d)for which he responded fast. He felt better the next day. Once he stopped prednisone he did ok for a while. But around spring of 2022 his symptoms came back and this time it was severe. [facility] and was told he does not have PMR and was sent home. Around 01/2023 his symptoms got worsen to the point he couldn't again get up from a seated position. He was prescribed a course of prednisone by a general practitioner at [place]. HE was on prednisone 15mg for few days, then 10mg for few days and then stayed for 5mg for few weeks. But while on prednisone 5mg around 04/2023 his symptoms got worse. So he himself increased the dose to 15mg for 5 days and now on 10mg for the last 3 days.  He denies any significant pain or weakness in his upper extremities. Denies any headaches, blurry vision, jaw pain, numbness or tingling. Denies any joint pain or swelling. AM stiffness lasts for one hour. He also c/o trouble opening jars.   He has a hx of fall in the boat and had a fracture in his lumbar spine in the past.   He had extensive blood work in the past which showed normal ESR, CPK, AST, ALT, aldolase. He only had one positive CRP of 1.6 on 10/2022, rest of the  labs were normal. Hs has been on a statin since 10/2022.      07/23  When he tapered his PRD to 10 mg, his symptoms came back. He called me and we increased his PRD dose to 12.5 mg daily.  Today, he is doing better. He reports only mild thigh pain, but no stiffness.  He denies any cranial symptoms, visual problem.     PMHx: HTN, HLD, hearing loss, hx of thrombophlebitis and PE  Allergies: NKDA  Family hx: no family hx of autoimmune diseses  Social hx: denies smoking, alcohol or other recreational drugs, wife is a retired anesthesiologist      Interval history:  In 10/23, he tapered his PRD dose to 8 mg daily, but he had a COVID-19 infection in November and then he had a flare with hip, shoulder pain and AM stiffness. We increased his PRD dose to 15 mg daily and his symptoms improved.    Interval history:   He tapered down his PRD, but did not tolerate it due to a flare.  Now, he is using PRD 13 mg daily.  His main problem is weakness, not proximal pain or stiffness.  He has been using MTX 17.5 mg week since 02/24  Meds:  MTX 17.5 mg week  PRD 13 mg daily  Folic acid 1 mg daily  ROS  Joint pain in hands: negative   Joint swelling: negative  Morning stiffness and duration:   strength: normal  Oral ulcer: negative  Genital ulcer: negative  Raynaud phenomenon: negative  Chest pain/dyspnea: negative  Low back pain: negative  Visual problem: negative  Dry eyes/dry mouth: negative  Skin rash/scaling/psoriasis: negative       Objective     PEXAM  VS reviewed, WNL  General: Alert, no distress   HEENT: Normocephalic/atraumatic, No alopecia. PERRLA. Sclera white, conjunctiva pink, no malar rash. no oral or nasal ulcer. Oral cavity pink and moist, no erythema or exudate, dentition good.   Neck: supple  Respiratory: CTA B, no adventitious breath sounds  Cardiac: RRR, no murmurs, carotid, or bruits  Abdominal: symmetrical, soft, non-tender, non-distended, normoactive BSx4 quadrants, no CVA tenderness or suprapubic  tenderness  MSK: Joints of upper and lower extremities were assessed for synovitis and ROM.    Today she has no evidence of synovitis in the joints of her hands or wrists, tender joint count 0, swollen joint count 0   Extremities: no clubbing, no cyanosis, no edema  Skin: Skin warm and moist.   Neuro: non-focal, Strength 5/5 throughout. Normal gait. No cerebellar pathologic exam     Assessment/Plan   He has some positive symptoms and signs suggestive of PMR including pain in thighs responding to prednisone, one time elevated CRP in 10/2022. But he also has some atypical symptoms. He has weakness which is not very typical for PMR and his ESR, CRP, CPK has been normal most of the times which argues against PMR.  He does not have any GCA symptoms.      He labs showed normal ESR, CPK, AST, ALT, aldolase. He only had one positive CRP of 1.6 on 10/2022, rest of the labs were normal. Hs has been on a statin since 10/2022.     At this time his diagnosis is unclear. But because his symptoms are responding to prednisone we will treat empirically for possible PMR with longer prednisone taper. He stopped statin treatment due to possible statin associated myalgia. But, he has not noted any changes.   Overall, he was diagnosed with PMR before and steroid significantly helped his pain. Then, he stopped PRD and he had severe symptoms, he started PRD again. We saw him and tried steroid tapering, but could not tolerate tapering after 12.5 mg.  His PMR is atypical, it presented with lower extremity proximal symptoms and knee pain, swelling, no upper extremity symptoms, cranial symptoms or peripheral arthritis, classical morning stiffness. Also, his acute phases were normal. Overall, his PMR diagnosis is suspicious, but responded steroid before and we decided to continue steroid and try tapering down. In 10/23, he tapered down his PRD to 8 mg daily, but he had a COVID-19 infection in 11/23 and his symptoms flared. We increased his PRD  dose to 15 mg and his hip, shoulder pain, stiffness improved.  In 02/24, we added  MTX in his treatment and then increased its dose to 17.5 mg week.  However, he had a mild flare and then increased his PRD to 15 mg, now he is using 13.  The main problem, he could not tolerate steroid tapering after 10 mg  -will try PRD tapering again, slowly 1 mg per month  -if he can not taper down, will add IL-6 blocker  -DEXKENNEY EGAN, using calcium and vit D  -will see him in 3 months

## 2024-04-25 ENCOUNTER — LAB (OUTPATIENT)
Dept: LAB | Facility: LAB | Age: 69
End: 2024-04-25
Payer: MEDICARE

## 2024-04-25 DIAGNOSIS — M35.3 POLYMYALGIA RHEUMATICA (MULTI): ICD-10-CM

## 2024-04-25 LAB
ALBUMIN SERPL BCP-MCNC: 4.1 G/DL (ref 3.4–5)
ALP SERPL-CCNC: 44 U/L (ref 33–136)
ALT SERPL W P-5'-P-CCNC: 33 U/L (ref 10–52)
ANION GAP SERPL CALC-SCNC: 14 MMOL/L (ref 10–20)
AST SERPL W P-5'-P-CCNC: 23 U/L (ref 9–39)
BASOPHILS # BLD AUTO: 0.03 X10*3/UL (ref 0–0.1)
BASOPHILS NFR BLD AUTO: 0.3 %
BILIRUB SERPL-MCNC: 0.7 MG/DL (ref 0–1.2)
BUN SERPL-MCNC: 27 MG/DL (ref 6–23)
CALCIUM SERPL-MCNC: 9.3 MG/DL (ref 8.6–10.6)
CHLORIDE SERPL-SCNC: 105 MMOL/L (ref 98–107)
CO2 SERPL-SCNC: 24 MMOL/L (ref 21–32)
CREAT SERPL-MCNC: 0.76 MG/DL (ref 0.5–1.3)
CRP SERPL-MCNC: 0.24 MG/DL
EGFRCR SERPLBLD CKD-EPI 2021: >90 ML/MIN/1.73M*2
EOSINOPHIL # BLD AUTO: 0.03 X10*3/UL (ref 0–0.7)
EOSINOPHIL NFR BLD AUTO: 0.3 %
ERYTHROCYTE [DISTWIDTH] IN BLOOD BY AUTOMATED COUNT: 12 % (ref 11.5–14.5)
ERYTHROCYTE [SEDIMENTATION RATE] IN BLOOD BY WESTERGREN METHOD: 10 MM/H (ref 0–20)
GLUCOSE SERPL-MCNC: 162 MG/DL (ref 74–99)
HCT VFR BLD AUTO: 40.6 % (ref 41–52)
HGB BLD-MCNC: 14 G/DL (ref 13.5–17.5)
IMM GRANULOCYTES # BLD AUTO: 0.05 X10*3/UL (ref 0–0.7)
IMM GRANULOCYTES NFR BLD AUTO: 0.5 % (ref 0–0.9)
LYMPHOCYTES # BLD AUTO: 0.74 X10*3/UL (ref 1.2–4.8)
LYMPHOCYTES NFR BLD AUTO: 8.1 %
MCH RBC QN AUTO: 32.9 PG (ref 26–34)
MCHC RBC AUTO-ENTMCNC: 34.5 G/DL (ref 32–36)
MCV RBC AUTO: 95 FL (ref 80–100)
MONOCYTES # BLD AUTO: 0.38 X10*3/UL (ref 0.1–1)
MONOCYTES NFR BLD AUTO: 4.1 %
NEUTROPHILS # BLD AUTO: 7.95 X10*3/UL (ref 1.2–7.7)
NEUTROPHILS NFR BLD AUTO: 86.7 %
NRBC BLD-RTO: 0 /100 WBCS (ref 0–0)
PLATELET # BLD AUTO: 251 X10*3/UL (ref 150–450)
POTASSIUM SERPL-SCNC: 4.4 MMOL/L (ref 3.5–5.3)
PROT SERPL-MCNC: 6.7 G/DL (ref 6.4–8.2)
RBC # BLD AUTO: 4.26 X10*6/UL (ref 4.5–5.9)
SODIUM SERPL-SCNC: 139 MMOL/L (ref 136–145)
WBC # BLD AUTO: 9.2 X10*3/UL (ref 4.4–11.3)

## 2024-04-25 PROCEDURE — 36415 COLL VENOUS BLD VENIPUNCTURE: CPT

## 2024-04-25 PROCEDURE — 86140 C-REACTIVE PROTEIN: CPT

## 2024-04-25 PROCEDURE — 80053 COMPREHEN METABOLIC PANEL: CPT

## 2024-04-25 PROCEDURE — 85025 COMPLETE CBC W/AUTO DIFF WBC: CPT

## 2024-04-25 PROCEDURE — 85652 RBC SED RATE AUTOMATED: CPT

## 2024-05-13 DIAGNOSIS — M35.3 POLYMYALGIA RHEUMATICA (MULTI): ICD-10-CM

## 2024-05-14 RX ORDER — PREDNISONE 5 MG/1
5 TABLET ORAL DAILY
Qty: 60 TABLET | Refills: 5 | Status: SHIPPED | OUTPATIENT
Start: 2024-05-14

## 2024-05-15 RX ORDER — METHOTREXATE 2.5 MG/1
17.5 TABLET ORAL
COMMUNITY
End: 2024-06-06 | Stop reason: SDUPTHER

## 2024-05-21 ENCOUNTER — CLINICAL SUPPORT (OUTPATIENT)
Dept: AUDIOLOGY | Facility: CLINIC | Age: 69
End: 2024-05-21
Payer: MEDICARE

## 2024-05-21 DIAGNOSIS — H90.3 SENSORINEURAL HEARING LOSS (SNHL) OF BOTH EARS: Primary | ICD-10-CM

## 2024-05-21 DIAGNOSIS — Z96.21 COCHLEAR IMPLANT IN PLACE: ICD-10-CM

## 2024-05-21 NOTE — PROGRESS NOTES
Cochlear Implant Programming and Hearing Aid Check    Name:  Ulysses Murguia  :  1955  Age:  68 y.o.  Date of Evaluation:  2024  Time: 830-920        Ear Make and Model Serial Number /Tube size Dome Warranty Expiration   Left ReSound Nexia [id number]  length 1 Small power 2027     RIGHT DEVICE  External Device: Cochlear [id number] (N8) sound processor with acoustic component  Internal Device: Cochlear ID Watchdogs   Surgeon: Tabatha Pelayo MD  Implantation Date: 2023  LEFT DEVICE  Resound Nexia #[id number] with custom earmold  length 1  Warranty expires 2027    HISTORY  Ulysses Murguia arrives today for a left hearing aid check of his Resound Nexia MAGNUS device We sent the Nexia MAGNUS hearing aid in for repair recently, however he returns today with the same issue. The microphone went out in the hearing aid again, as he is only able to hear through the device when streaming from the Mini Romulo or his iPhone.     Recall, Mr. Murguia had previously utilized his loss and damage claim after misplacing his right N8 processor. He then subsequently found the processor in his shed. He is aware there is no longer a valid warranty on this device ([id number]).     Recall, Ulysses Murguia has a documented history of severe to profound sensorineural hearing loss, bilaterally.    PROGRAMMING  Hearing aid listening check revealed no amplification. Hearing aid is being replaced by Resound per rep Avila. She wants the current device to be sent specifically to  for inspection. Clinic [facility] device programmed and dispensed for use during repair process. Custom earmold in use. When repaired hearing aid is in, patient would like to adjust noise filter, wind management, etc features in the All-Around program, to avoid the need to manually adjust these to preferred settings every day.           RECOMMENDATIONS  1. Continue medical follow-up with your neuro-otologist Dr. Pelayo.  2.  Utilize the Cochlear [employer] Nucleus Cochlear [id number] (N8) sound processor daily using the most tolerated program.  3. Return in 6 months for programming, optimization, and functional testing.   4. Utilize aural rehabilitation/auditory training programs, books on tape, and written materials at home to improve speech understanding ability. Time should be spent in right CI only condition for rehab.  5. Return when replacement device arrives. We will call patient when it comes in.    [phone]    Abebe Hanks, CCC-A

## 2024-05-29 ENCOUNTER — APPOINTMENT (OUTPATIENT)
Dept: AUDIOLOGY | Facility: CLINIC | Age: 69
End: 2024-05-29
Payer: MEDICARE

## 2024-05-29 ENCOUNTER — CLINICAL SUPPORT (OUTPATIENT)
Dept: AUDIOLOGY | Facility: CLINIC | Age: 69
End: 2024-05-29

## 2024-05-29 DIAGNOSIS — Z96.21 COCHLEAR IMPLANT IN PLACE: ICD-10-CM

## 2024-05-29 DIAGNOSIS — H90.3 SENSORINEURAL HEARING LOSS (SNHL) OF BOTH EARS: Primary | ICD-10-CM

## 2024-05-29 PROCEDURE — 92604 REPROGRAM COCHLEAR IMPLT 7/>: CPT | Performed by: AUDIOLOGIST

## 2024-05-29 NOTE — PROGRESS NOTES
Cochlear Implant Programming and Hearing Aid Check    Name:  Ulysses Murguia  :  1955  Age:  68 y.o.  Date of Evaluation:  2024  Time: 2827-2325        Ear Make and Model Serial Number /Tube size Dome Warranty Expiration   Left ReSound Nexia [id number] (new SN)  length 1 Custom EM 2027     RIGHT DEVICE  External Device: Cochlear [id number] (N8) sound processor with acoustic component  Internal Device: Cochlear 3D [employer]omat[employer]s   Surgeon: Tabatha Pelayo MD  Implantation Date: 2023  LEFT DEVICE  Resound Nexia **NEW** serial number #[id number] with custom earmold   Warranty expires 2027    HISTORY  Ulysses Murguia arrives today for a left hearing aid check of his Resound Nexia MAGNUS device We sent the Nexia MAGNUS hearing aid in for repair for a second time, as his hearing aid had the same issue occur.  The microphone went out in the hearing aid again, as he was only able to hear through the device when streaming from the Mini Romulo or his iPhone.     Recall, Mr. Murguia had previously utilized his loss and damage claim after misplacing his right N8 processor. He then subsequently found the processor in his shed. He is aware there is no longer a valid warranty on this device ([id number]).     Recall, Ulysses Murguia has a documented history of severe to profound sensorineural hearing loss, bilaterally.    PROGRAMMING  Hearing aid was replaced by [facility] per rep Avila. She requested the malfunctioning device to be sent specifically to  for inspection. Clinic [employer] Qu[employer]ro device was returned today. Custom earmold in use. Replacement device was fit today. Programmed to previous settings at 110% gain. Reduced Impact noise reduction from moderate to mild in All-Around program per patient request.  New hearing aid was linked to the processor in CustomSound. The N8 processor and Nexia hearing aid were paired to the recipient's iPhone, Mini Romulo, and TV Streamer.          RECOMMENDATIONS  1. Continue medical follow-up with your neuro-otologist Dr. Pelayo.  2. Utilize the Cochlear [employer] Nucleus Cochlear [id number] (N8) sound processor daily using the most tolerated program.  3. Return in 3 months for programming, optimization, and functional testing.   4. Utilize aural rehabilitation/auditory training programs, books on tape, and written materials at home to improve speech understanding ability. Time should be spent in right CI only condition for rehab.  5. Return when replacement device arrives. We will call patient when it comes in.    [phone]    Abebe Hanks, CCC-A

## 2024-06-06 ENCOUNTER — TELEPHONE (OUTPATIENT)
Dept: RHEUMATOLOGY | Facility: CLINIC | Age: 69
End: 2024-06-06

## 2024-06-06 DIAGNOSIS — M35.3 PMR (POLYMYALGIA RHEUMATICA) (MULTI): Primary | ICD-10-CM

## 2024-06-06 RX ORDER — METHOTREXATE 2.5 MG/1
17.5 TABLET ORAL
Qty: 84 TABLET | Refills: 0 | Status: SHIPPED | OUTPATIENT
Start: 2024-06-09 | End: 2024-09-07

## 2024-06-25 ENCOUNTER — CLINICAL SUPPORT (OUTPATIENT)
Dept: AUDIOLOGY | Facility: CLINIC | Age: 69
End: 2024-06-25
Payer: MEDICARE

## 2024-06-25 DIAGNOSIS — H90.3 SENSORINEURAL HEARING LOSS (SNHL) OF BOTH EARS: Primary | ICD-10-CM

## 2024-06-25 NOTE — PROGRESS NOTES
Cochlear Implant Programming and Hearing Aid Check    Name:  Ulysses Murguia  :  1955  Age:  69 y.o.  Date of Evaluation:  2024  Time: 920-1000        Ear Make and Model Serial Number /Tube size Dome Warranty Expiration   Left ReSound Nexia [id number]   length 1 Custom EM 2027     RIGHT DEVICE  External Device: Cochlear [id number] (N8) sound processor with acoustic component  Internal Device: Cochlear [employer]   Surgeon: Tabatha Pelayo MD  Implantation Date: 2023  LEFT DEVICE  Resound Nexia #[id number] with custom earmold  length 1  Warranty expires 2027    HISTORY  Ulysses Murguia arrives today for a left hearing aid check of his Resound Nexia MAGNUS device. We sent the Nexia MAGNUS hearing aid in for repair and also had the device completely replaced recently, however he returns today with the same issue. The microphone went out in the hearing aid again, as he is only able to hear through the device when streaming from the Mini Romulo or his iPhone. Dates of service listed below:    4/10/24 presented with non-working hearing aid  24 dispensed repaired hearing aid  24 presented with non-working hearing aid  24 dispensed NEW replacement hearing aid new #[id number] (sent previous aid to )  24 presented with non-working hearing aid (today)     Recall, Ulysses Murguia has a documented history of severe to profound sensorineural hearing loss, bilaterally.    PROGRAMMING  Hearing aid listening check revealed no amplification. Resound team has been notified, as well as Audiology , who is escalating the issue. Clinic [employer] Phonak L90-RT MAGNUS hearing aid ([id number]) programmed and dispensed for use during repair process. Custom earmold in use. Also paired and dispensed a clinic loaner [employer] (#[id number]) for use during this time period.        RECOMMENDATIONS  1. Continue medical follow-up with your neuro-otologist   Pierce.  2. Utilize the Cochlear [employer] Nucleus Cochlear CE4987 (N8) sound processor daily using the most tolerated program.  3. Return in 6 months for programming, optimization, and functional testing.   4. Utilize aural rehabilitation/auditory training programs, books on tape, and written materials at home to improve speech understanding ability. Time should be spent in right CI only condition for rehab.  5. Return when next steps have been decided regarding replacement of left hearing aid versus return/refund of device. Reliability issue has been escalated with [employer].    [phone]    Abebe Hanks, CCC-A

## 2024-07-19 ENCOUNTER — APPOINTMENT (OUTPATIENT)
Dept: RHEUMATOLOGY | Facility: CLINIC | Age: 69
End: 2024-07-19
Payer: MEDICARE

## 2024-07-19 ENCOUNTER — LAB (OUTPATIENT)
Dept: LAB | Facility: LAB | Age: 69
End: 2024-07-19
Payer: MEDICARE

## 2024-07-19 ENCOUNTER — APPOINTMENT (OUTPATIENT)
Dept: AUDIOLOGY | Facility: CLINIC | Age: 69
End: 2024-07-19
Payer: MEDICARE

## 2024-07-19 VITALS
TEMPERATURE: 97.6 F | BODY MASS INDEX: 25.84 KG/M2 | HEIGHT: 73 IN | SYSTOLIC BLOOD PRESSURE: 133 MMHG | DIASTOLIC BLOOD PRESSURE: 72 MMHG | OXYGEN SATURATION: 94 % | WEIGHT: 195 LBS | HEART RATE: 60 BPM

## 2024-07-19 DIAGNOSIS — M35.3 PMR (POLYMYALGIA RHEUMATICA) (MULTI): ICD-10-CM

## 2024-07-19 DIAGNOSIS — H90.3 SENSORINEURAL HEARING LOSS (SNHL) OF BOTH EARS: Primary | ICD-10-CM

## 2024-07-19 DIAGNOSIS — M35.3 POLYMYALGIA RHEUMATICA (MULTI): ICD-10-CM

## 2024-07-19 DIAGNOSIS — Z96.21 COCHLEAR IMPLANT IN PLACE: ICD-10-CM

## 2024-07-19 DIAGNOSIS — M35.3 PMR (POLYMYALGIA RHEUMATICA) (MULTI): Primary | ICD-10-CM

## 2024-07-19 LAB
ALBUMIN SERPL BCP-MCNC: 4 G/DL (ref 3.4–5)
ALP SERPL-CCNC: 46 U/L (ref 33–136)
ALT SERPL W P-5'-P-CCNC: 52 U/L (ref 10–52)
ANION GAP SERPL CALC-SCNC: 15 MMOL/L (ref 10–20)
AST SERPL W P-5'-P-CCNC: 30 U/L (ref 9–39)
BASOPHILS # BLD AUTO: 0.04 X10*3/UL (ref 0–0.1)
BASOPHILS NFR BLD AUTO: 0.5 %
BILIRUB SERPL-MCNC: 0.6 MG/DL (ref 0–1.2)
BUN SERPL-MCNC: 19 MG/DL (ref 6–23)
CALCIUM SERPL-MCNC: 9.2 MG/DL (ref 8.6–10.6)
CHLORIDE SERPL-SCNC: 105 MMOL/L (ref 98–107)
CO2 SERPL-SCNC: 26 MMOL/L (ref 21–32)
CREAT SERPL-MCNC: 0.87 MG/DL (ref 0.5–1.3)
CRP SERPL-MCNC: 0.16 MG/DL
EGFRCR SERPLBLD CKD-EPI 2021: >90 ML/MIN/1.73M*2
EOSINOPHIL # BLD AUTO: 0.15 X10*3/UL (ref 0–0.7)
EOSINOPHIL NFR BLD AUTO: 1.7 %
ERYTHROCYTE [DISTWIDTH] IN BLOOD BY AUTOMATED COUNT: 12 % (ref 11.5–14.5)
ERYTHROCYTE [SEDIMENTATION RATE] IN BLOOD BY WESTERGREN METHOD: 10 MM/H (ref 0–20)
GLUCOSE SERPL-MCNC: 93 MG/DL (ref 74–99)
HCT VFR BLD AUTO: 41.7 % (ref 41–52)
HGB BLD-MCNC: 14 G/DL (ref 13.5–17.5)
IMM GRANULOCYTES # BLD AUTO: 0.11 X10*3/UL (ref 0–0.7)
IMM GRANULOCYTES NFR BLD AUTO: 1.3 % (ref 0–0.9)
LYMPHOCYTES # BLD AUTO: 1.6 X10*3/UL (ref 1.2–4.8)
LYMPHOCYTES NFR BLD AUTO: 18.4 %
MCH RBC QN AUTO: 32.6 PG (ref 26–34)
MCHC RBC AUTO-ENTMCNC: 33.6 G/DL (ref 32–36)
MCV RBC AUTO: 97 FL (ref 80–100)
MONOCYTES # BLD AUTO: 0.8 X10*3/UL (ref 0.1–1)
MONOCYTES NFR BLD AUTO: 9.2 %
NEUTROPHILS # BLD AUTO: 6 X10*3/UL (ref 1.2–7.7)
NEUTROPHILS NFR BLD AUTO: 68.9 %
NRBC BLD-RTO: 0 /100 WBCS (ref 0–0)
PLATELET # BLD AUTO: 245 X10*3/UL (ref 150–450)
POTASSIUM SERPL-SCNC: 4.1 MMOL/L (ref 3.5–5.3)
PROT SERPL-MCNC: 6.3 G/DL (ref 6.4–8.2)
RBC # BLD AUTO: 4.3 X10*6/UL (ref 4.5–5.9)
SODIUM SERPL-SCNC: 142 MMOL/L (ref 136–145)
WBC # BLD AUTO: 8.7 X10*3/UL (ref 4.4–11.3)

## 2024-07-19 PROCEDURE — V5264 EAR MOLD/INSERT: HCPCS | Mod: LT,AUDSP

## 2024-07-19 PROCEDURE — 86140 C-REACTIVE PROTEIN: CPT

## 2024-07-19 PROCEDURE — 3008F BODY MASS INDEX DOCD: CPT | Performed by: INTERNAL MEDICINE

## 2024-07-19 PROCEDURE — 99214 OFFICE O/P EST MOD 30 MIN: CPT | Performed by: INTERNAL MEDICINE

## 2024-07-19 PROCEDURE — 80053 COMPREHEN METABOLIC PANEL: CPT

## 2024-07-19 PROCEDURE — 85652 RBC SED RATE AUTOMATED: CPT

## 2024-07-19 PROCEDURE — 36415 COLL VENOUS BLD VENIPUNCTURE: CPT

## 2024-07-19 PROCEDURE — 1036F TOBACCO NON-USER: CPT | Performed by: INTERNAL MEDICINE

## 2024-07-19 PROCEDURE — 85025 COMPLETE CBC W/AUTO DIFF WBC: CPT

## 2024-07-19 PROCEDURE — 1159F MED LIST DOCD IN RCRD: CPT | Performed by: INTERNAL MEDICINE

## 2024-07-19 NOTE — PROGRESS NOTES
Subjective   Patient ID: Ulysses Murguia is a 69 y.o. male who presents for Follow-up.    HPI  This is a 70 yo male, is here as a new patient with the chief complain of bilateral proximal lower extremity pain and weakness for 3 years.   Patient says that he started having pain and weakness in his thighs about 2-3 years ago. He had trouble getting up from a chair, getting up from the toilet seat. He was first seen by an orthopedic surgeon and initially thought his symptoms were due to swollen knees. But after doing x-rays of his knees he was told that his symptoms are not due to knee problem, but most likely due to PMR. Then he was seen by a nurse practitioner around 02/2021 and started him on prednisone course (15mgx 5d, 10mgx 5d, 5mgx 5d)for which he responded fast. He felt better the next day. Once he stopped prednisone he did ok for a while. But around spring of 2022 his symptoms came back and this time it was severe. He was seen by multiple rheumatologist at [facility] and was told he does not have PMR and was sent home. Around 01/2023 his symptoms got worsen to the point he couldn't again get up from a seated position. He was prescribed a course of prednisone by a general practitioner at [place]. HE was on prednisone 15mg for few days, then 10mg for few days and then stayed for 5mg for few weeks. But while on prednisone 5mg around 04/2023 his symptoms got worse. So he himself increased the dose to 15mg for 5 days and now on 10mg for the last 3 days.  He denies any significant pain or weakness in his upper extremities. Denies any headaches, blurry vision, jaw pain, numbness or tingling. Denies any joint pain or swelling. AM stiffness lasts for one hour. He also c/o trouble opening jars.   He has a [place] and had a fracture in his lumbar spine in the past.   He had extensive blood work in the past which showed normal ESR, CPK, AST, ALT, aldolase. He only had one positive CRP of 1.6 on 10/2022, rest of the  labs were normal. Hs has been on a statin since 10/2022.      07/23  When he tapered his PRD to 10 mg, his symptoms came back. He called me and we increased his PRD dose to 12.5 mg daily.  Today, he is doing better. He reports only mild thigh pain, but no stiffness.  He denies any cranial symptoms, visual problem.     PMHx: HTN, HLD, hearing loss, hx of thrombophlebitis and PE  Allergies: NKDA  Family hx: no family hx of autoimmune diseses  Social hx: denies smoking, alcohol or other recreational drugs, wife is a retired anesthesiologist      Interval history:  In 10/23, he tapered his PRD dose to 8 mg daily, but he had a COVID-19 infection in November and then he had a flare with hip, shoulder pain and AM stiffness. We increased his PRD dose to 15 mg daily and his symptoms improved.     05/24:  He tapered down his PRD, but did not tolerate it due to a flare.  Now, he is using PRD 13 mg daily.  His main problem is weakness, not proximal pain or stiffness.  He has been using MTX 17.5 mg week since 02/24    Interval history:  He tapered his PRD to 8 mg daily and no severe flare    Meds:  MTX 17.5 mg week  PRD 8 mg daily  Folic acid 1 mg daily    ROS  Joint pain in hands: negative   Joint swelling: negative  Morning stiffness and duration: negative   strength: normal  Oral ulcer: negative  Genital ulcer: negative  Raynaud phenomenon: negative  Chest pain/dyspnea: negative  Low back pain: negative  Visual problem: negative  Dry eyes/dry mouth: negative  Skin rash/scaling/psoriasis: negative       Objective     PEXAM  VS reviewed, WNL  General: Alert, no distress   HEENT: Normocephalic/atraumatic, No alopecia. PERRLA. Sclera white, conjunctiva pink, no malar rash. no oral or nasal ulcer. Oral cavity pink and moist, no erythema or exudate, dentition good.   Neck: supple  Respiratory: CTA B, no adventitious breath sounds  Cardiac: RRR, no murmurs, carotid, or bruits  Abdominal: symmetrical, soft, non-tender,  non-distended, normoactive BSx4 quadrants, no CVA tenderness or suprapubic tenderness  MSK: Joints of upper and lower extremities were assessed for synovitis and ROM.    Today she has no evidence of synovitis in the joints of her hands or wrists, tender joint count 0, swollen joint count 0   Extremities: no clubbing, no cyanosis, no edema  Skin: Skin warm and moist.   Neuro: non-focal, Strength 5/5 throughout. Normal gait. No cerebellar pathologic exam     Assessment/Plan   He has some positive symptoms and signs suggestive of PMR including pain in thighs responding to prednisone, one time elevated CRP in 10/2022. But he also has some atypical symptoms. He has weakness which is not very typical for PMR and his ESR, CRP, CPK has been normal most of the times which argues against PMR.  He does not have any GCA symptoms.      He labs showed normal ESR, CPK, AST, ALT, aldolase. He only had one positive CRP of 1.6 on 10/2022, rest of the labs were normal. Hs has been on a statin since 10/2022.     At this time his diagnosis is unclear. But because his symptoms are responding to prednisone we will treat empirically for possible PMR with longer prednisone taper. He stopped statin treatment due to possible statin associated myalgia. But, he has not noted any changes.   Overall, he was diagnosed with PMR before and steroid significantly helped his pain. Then, he stopped PRD and he had severe symptoms, he started PRD again. We saw him and tried steroid tapering, but could not tolerate tapering after 12.5 mg.  His PMR is atypical, it presented with lower extremity proximal symptoms and knee pain, swelling, no upper extremity symptoms, cranial symptoms or peripheral arthritis, classical morning stiffness. Also, his acute phases were normal. Overall, his PMR diagnosis is suspicious, but responded steroid before and we decided to continue steroid and try tapering down. In 10/23, he tapered down his PRD to 8 mg daily, but he had a  COVID-19 infection in 11/23 and his symptoms flared. We increased his PRD dose to 15 mg and his hip, shoulder pain, stiffness improved.  In 02/24, we added MTX in his treatment and then increased its dose to 17.5 mg week.  Today, he tapered down his PRD to 8 mg and no severe flare  PExam did not reveal any synovitis.  -will see his acute phases, CBC, CMP  -will continue PRD tapering 1 mg per month  -will see him in 3-4 months

## 2024-07-23 NOTE — PROGRESS NOTES
Hearing Aid Check    Name:  Ulysses Murguia  :  1955  Age:  69 y.o.  Date of Evaluation:  2024  Time: 10:00-10:30, 12:00-:00        Ear Make and Model Serial Number /Tube size Dome Warranty Expiration   Left ReSound Nexia [id number]   length 1 Custom EM 2027     RIGHT DEVICE  External Device: Cochlear [id number] (N8) sound processor with acoustic component  Internal Device: Cochlear [employer]   Surgeon: Tabatha Pelayo MD  Implantation Date: 2023    LEFT DEVICE  Resound Nexia #[id number] with custom earmold  length 1  Warranty expires 2027    Patient requested a duplicate earmold  [facility] had EM on file  Paid in full prior to leaving     HISTORY  Ulysses Murguia arrives today for a left hearing aid check of his Resound Nexia MAGNUS device. He arrives today to  his repaired ReSound hearing aid. Recall, We sent the Nexia MAGNUS hearing aid in for repair and also had the device completely replaced recently, however he returns today with the same issue. The microphone went out in the hearing aid again, as he is only able to hear through the device when streaming from the Mini Ericka or his iPhone.     He arrives to return the loaner hearing aid and partner ericka. He has purchased a Phonak hearing aid, Kody InON and Kody boot with his new device. He would like an earmold for that device    Dates of service listed below:    4/10/24 presented with non-working hearing aid  24 dispensed repaired hearing aid  24 presented with non-working hearing aid  24 dispensed NEW replacement hearing aid new #[id number] (sent previous aid to )  24 presented with non-working hearing aid (today)  2024 dispensed repaired hearing aid while MARIA FERNANDA Quinteros was out of office.    Recall, Ulysses Murguia has a documented history of severe to profound sensorineural hearing loss, bilaterally.    PROGRAMMING  Upon arrival of the repaired device I had difficulty linking  his hearing aid to the processor, the processor and hearing aid to phone and then the hearing aid to bluetooth accessories.    Eventually, the hearing aid was able to pair to the cochlear EAS processor.  His devices would not connect bilaterally in the accessibility portion of setting, but after excessive attempted connected.  Hearing aid would not appear in cochlear application  Confirmed lack of pairing was due to Nexia platform by testing with ONE and Quattro device  Hearing aid paired to his ReSound application after increased difficulty  Mini Romulo accessory struggled to pair to hearing aid and processor.   Would not access both via accessibility portion of settings  Access in 2 varying applications.    RECOMMENDATIONS  1. Continue medical follow-up with your neuro-otologist Dr. Pelayo.  2. Utilize the Cochlear [employer] Nucleus Cochlear [id number] (N8) sound processor daily using the most tolerated program.  3. Return in 6 months for programming, optimization, and functional testing.   4. Utilize aural rehabilitation/auditory training programs, books on tape, and written materials at home to improve speech understanding ability. Time should be spent in right CI only condition for rehab.    Sveta Grey, Abebe, CCCA

## 2024-08-06 DIAGNOSIS — M35.3 PMR (POLYMYALGIA RHEUMATICA) (MULTI): ICD-10-CM

## 2024-08-06 RX ORDER — METHOTREXATE 2.5 MG/1
TABLET ORAL
Qty: 84 TABLET | Refills: 3 | Status: SHIPPED | OUTPATIENT
Start: 2024-08-06

## 2024-08-25 ENCOUNTER — HOSPITAL ENCOUNTER (OUTPATIENT)
Dept: RADIOLOGY | Facility: EXTERNAL LOCATION | Age: 69
Discharge: HOME | End: 2024-08-25
Payer: MEDICARE

## 2024-08-25 DIAGNOSIS — S69.91XA INJURY OF RIGHT THUMB, INITIAL ENCOUNTER: ICD-10-CM

## 2024-09-10 ENCOUNTER — CLINICAL SUPPORT (OUTPATIENT)
Dept: AUDIOLOGY | Facility: CLINIC | Age: 69
End: 2024-09-10
Payer: MEDICARE

## 2024-09-10 DIAGNOSIS — H90.3 SENSORINEURAL HEARING LOSS (SNHL) OF BOTH EARS: Primary | ICD-10-CM

## 2024-09-10 DIAGNOSIS — Z96.21 COCHLEAR IMPLANT IN PLACE: ICD-10-CM

## 2024-09-10 PROCEDURE — 92604 REPROGRAM COCHLEAR IMPLT 7/>: CPT | Performed by: AUDIOLOGIST

## 2024-09-10 NOTE — PROGRESS NOTES
Cochlear Implant Programming and Hearing Aid Check    Name:  Ulysses Murguia  :  1955  Age:  69 y.o.  Date of Evaluation:  09/10/24   Time: 8103-6375        Ear Make and Model Serial Number /Tube size Dome Warranty Expiration   Left ReSound Nexia [id number]   length 1 Custom EM 2027     RIGHT DEVICE  External Device: Cochlear [id number] (N8) sound processor with acoustic component  Internal Device: Cochlear [employer]   Surgeon: Tabatha Pelayo MD  Implantation Date: 2023  LEFT DEVICE  Resound Nexia #[id number] with custom earmold  length 1  Warranty expires 2027    HISTORY  Ulysses Murguia arrives today for a left hearing aid check of his Resound Nexia MAGNUS device. Patient recently messaged the clinic stating his hearing aid microphones stopped working again. He requested a refund be issued and an alternative option for amplification on the left side. [facility] as the hearing aid is outside the typical allowed return-for-credit window. Due to the extensive difficulties with the device (listed below), an exception was requested.     The microphone went out in the hearing aid again, as he is only able to hear through the device when streaming from the Mini Romulo or his iPhone. Dates of service listed below:  4/10/24 presented with non-working hearing aid  24 dispensed repaired hearing aid  24 presented with non-working hearing aid  24 dispensed NEW replacement hearing aid new #[id number] (sent previous aid to )  24 presented with non-working hearing aid (today)  2024 dispensed repaired hearing aid while MARIA FERNANDA Quinteros was out of office  9/10/2024 presented with non-working hearing aid     Recall, Ulysses Murguia has a documented history of severe to profound sensorineural hearing loss, bilaterally.    PROGRAMMING  Hearing aid listening check revealed little amplification unless Mini  Romulo was paired and on. Hearing aid cleaned and checked; listening check revealed no improvement. Clinic loaner left Resound Omnia MAGNUS hearing aid (SN [id number]) programmed to patient settings. Loaner aid and its  dispensed for use during refund process. Custom earmold in use. Patient's hearing aid kept in office. Discussed the options moving forward. These options include ordering and fitting the previous generation of Resound hearing aids (Omnia) in order for streaming to work bimodally or fitting a different  hearing aid for the left ear (Phonak). Discussed the new platform of Phonak hearing aids and mentioned the current issue of their low stock. Patient also expressed interested in obtaining a back-up hearing aid as he travels often and is worried about another hearing aid breaking. Patient will be contacted regarding potential options for older versions of hearing aids for a back-up.    PacketVideo N8 processor connected to software. Ran impedances and levels are stable with previous impedances. In the software, N8 processor was disconnected from Resound Nexia hearing aid and re-paired with loaner Resound Omnia hearing aid.     Loaner hearing aid and N8 processor re-paired with MiniMic and iPhone. Instructed patient to reconnected devices to TV streamer at home into Accessory slot 2. Patient inquired about next steps in this process. As [facility] has not yet responded regarding refund, patient should continue use of N8 processor and loaner hearing aid. Patient will be contacted once more information is available from [facility]. Patient should consider his options, especially while loaner/trial device is in use. Patient should return for audiology visit when instructed regarding the left hearing aid.       RECOMMENDATIONS  1. Continue medical follow-up with your neuro-otologist Dr. Pelayo.  2. Utilize the PacketVideo Nucleus Cochlear HL0000 (N8) sound processor daily using the most  tolerated program and loaner hearing aid.  3. Return in 6 months for programming, optimization, and functional testing.   4. Utilize aural rehabilitation/auditory training programs, books on tape, and written materials at home to improve speech understanding ability. Time should be spent in right CI only condition for rehab.  5. Return when next steps have been decided regarding replacement of left hearing aid versus return/refund of device.     SHANIQUE Horne.  Audiology Graduate Clinician    Abebe Hanks, CCC-A

## 2024-09-18 ENCOUNTER — DOCUMENTATION (OUTPATIENT)
Dept: RESEARCH | Age: 69
End: 2024-09-18
Payer: MEDICARE

## 2024-09-20 ENCOUNTER — DOCUMENTATION (OUTPATIENT)
Dept: RESEARCH | Age: 69
End: 2024-09-20
Payer: MEDICARE

## 2024-10-14 DIAGNOSIS — M35.3 POLYMYALGIA RHEUMATICA (MULTI): ICD-10-CM

## 2024-10-14 RX ORDER — PREDNISONE 1 MG/1
7 TABLET ORAL DAILY
Qty: 210 TABLET | Refills: 0 | Status: SHIPPED | OUTPATIENT
Start: 2024-10-14

## 2024-10-15 ENCOUNTER — CLINICAL SUPPORT (OUTPATIENT)
Dept: AUDIOLOGY | Facility: CLINIC | Age: 69
End: 2024-10-15

## 2024-10-15 DIAGNOSIS — H90.3 SENSORINEURAL HEARING LOSS (SNHL) OF BOTH EARS: Primary | ICD-10-CM

## 2024-10-15 DIAGNOSIS — Z96.21 COCHLEAR IMPLANT IN PLACE: ICD-10-CM

## 2024-10-15 PROCEDURE — 92604 REPROGRAM COCHLEAR IMPLT 7/>: CPT | Performed by: AUDIOLOGIST

## 2024-10-15 NOTE — PROGRESS NOTES
Cochlear Implant Programming and Hearing Aid Check    Name:  Ulysses Murguia  :  1955  Age:  69 y.o.  Date of Evaluation:  10/15/24   Time: 3229-1094        Ear Make and Model Serial Number /Tube size Dome Warranty Expiration   Left ReSound Nexia [id number]   length 2m Custom EM 2027     RIGHT DEVICE  External Device: Cochlear [id number] (N8) sound processor with acoustic component  Internal Device: Cochlear [employer]   Surgeon: Tabatha Pelayo MD  Implantation Date: 2023  LEFT DEVICE  Resound Nexia #[id number] with custom earmold  length 2  Warranty expires 2027    HISTORY  Ulysses Murguia arrives today for a left hearing aid check of his Resound Nexia MAGNUS device. The device was sent in for repair due to microphone malfunction again. He is also seen today for cochlear implant programming and optimization.     The microphone went out in the hearing aid again, as he is only able to hear through the device when streaming from the Mini Romulo or his iPhone. Dates of service listed below:  4/10/24 presented with non-working hearing aid  24 dispensed repaired hearing aid  24 presented with non-working hearing aid  24 dispensed NEW replacement hearing aid new #[id number] (sent previous aid to )  24 presented with non-working hearing aid   2024 dispensed repaired hearing aid while MARIA FERNANDA Quinteros was out of office  9/10/2024 presented with non-working hearing aid  10/15/2024 dispensed repaired Nexia MAGNUS device, pt returned clinic Omnia loaner     Recall, Ulysses Murguia has a documented history of severe to profound sensorineural hearing loss, bilaterally.    PROGRAMMING  Clinic loaner left Resound Omnia MAGNUS hearing aid (SN [id number]) returned to clinic today. Custom earmold in use.   Cochlear [employer] N8 processor connected to software. Ran impedances and levels are stable with previous impedances. T- and C- measurements completed today. Increase  in C-levels overall. In the software, N8 processor and repaired Resound Nexia hearing aid were re-linked.     Patient's repaired Nexia hearing aid and N8 processor re-paired with MiniMic, TV Streamer and iPhone. Nucleus Smart Viviana reviewed today.    RECOMMENDATIONS  1. Continue medical follow-up with your neuro-otologist Dr. Pelayo.  2. Utilize the Cochlear [employer] Nucleus Cochlear [id number] (N8) sound processor daily using the most tolerated program and loaner hearing aid.  3. Return in 6-12 months for programming, optimization, and functional testing.   4. Utilize aural rehabilitation/auditory training programs, books on tape, and written materials at home to improve speech understanding ability. Time should be spent in right CI only condition for rehab.        Abebe Hansk, CCC-A  Time: 2394-3047

## 2024-11-22 ENCOUNTER — APPOINTMENT (OUTPATIENT)
Dept: RHEUMATOLOGY | Facility: CLINIC | Age: 69
End: 2024-11-22
Payer: MEDICARE

## 2024-11-26 ENCOUNTER — CLINICAL SUPPORT (OUTPATIENT)
Dept: AUDIOLOGY | Facility: CLINIC | Age: 69
End: 2024-11-26
Payer: MEDICARE

## 2024-11-26 DIAGNOSIS — H90.3 SENSORINEURAL HEARING LOSS (SNHL) OF BOTH EARS: Primary | ICD-10-CM

## 2024-11-26 DIAGNOSIS — Z96.21 COCHLEAR IMPLANT IN PLACE: ICD-10-CM

## 2024-11-26 PROCEDURE — 92557 COMPREHENSIVE HEARING TEST: CPT | Performed by: AUDIOLOGIST

## 2024-11-26 PROCEDURE — 92567 TYMPANOMETRY: CPT | Performed by: AUDIOLOGIST

## 2024-11-26 NOTE — PROGRESS NOTES
Cochlear Implant Programming and Hearing Aid Check    Name:  Ulysses Murguia  :  1955  Age:  69 y.o.  Date of Evaluation:  24           Ear Make and Model Serial Number /Tube size Dome Warranty Expiration   Left ReSound Nexia [id number]   length 2m Custom EM 2027     RIGHT DEVICE  External Device: Cochlear DS0451 (N8) sound processor with acoustic component  Internal Device: Cochlear [employer]   Surgeon: Tabatha Pelayo MD  Implantation Date: 2023  LEFT DEVICE  Resound Nexia #[id number] with custom earmold  length 2  Warranty expires 2027    HISTORY  Ulysses Murguia arrives today for a left hearing aid check of his Resound Nexia MAGNUS device. The device needs to be sent in for repair due to microphone malfunction again. He is also seen today for an updated hearing test.    The microphone went out in the hearing aid again, as he is only able to hear through the device when streaming from the Mini Romulo or his iPhone. Dates of service listed below:  4/10/24 presented with non-working hearing aid  24 dispensed repaired hearing aid  24 presented with non-working hearing aid  24 dispensed NEW replacement hearing aid new #[id number] (sent previous aid to )  24 presented with non-working hearing aid   2024 dispensed repaired hearing aid while MARIA FERNANDA Quinteros was out of office  9/10/2024 presented with non-working hearing aid  10/15/2024 dispensed repaired Nexia MAGNUS device, pt returned clinic Omnia lobelen  24 presented with non-working hearing aid, given Omnia clinic loaner     Recall, Ulysses Murguia has a documented history of severe to profound sensorineural hearing loss, bilaterally.    Evaluation:    Otoscopy revealed clear ear canals bilaterally.    Immittance testing revealed normal middle ear function bilaterally.  Right ear (implanted): severe sensorineural hearing loss with good word discrimination (84%) unaided  Left ear:  moderate to severe sensorineural hearing loss with good word discrimination (80%) unaided    Otoscopy revealed clear ear canals bilaterally.  Immittance testing revealed normal middle ear function bilaterally with present acoustic reflexes.  Distortion product otoacoustic emissions are present for 1706-8495 Hz bilaterally.  Audiological results reveal normal hearing sensitivity with excellent word discrimination bilaterally.  Results are consistent with normal middle ear function bilaterally, normal hearing sensitivity bilaterally.       PROGRAMMING  Clinic loaner left Resound Omnia MAGNUS hearing aid (SN [id number]) dispensed to patient today. Custom earmold in use.   Cochlear [employer] N8 processor connected to software. Ran impedances and levels are stable with previous impedances. N8 processor and Omnia loaner re-linked in CustomSound. Paired the Omnia loaner and his N8 to his iPhone, his TV Streamer, and his MiniMKeyMe. Nucleus Smart Viviana reviewed today. Bimodal function confirmed in Nucleus Smart Viviana.     *Spoke with Resound Rep following appointment. Will plan to demo a BTE style with replaceable ericka covers to see if device durability improves. BTE being sent to clinic for demo purposes.     RECOMMENDATIONS  1. Continue medical follow-up with your neuro-otologist Dr. Pelayo.  2. Utilize the Cochlear [employer] Nucleus Cochlear HI3333 (N8) sound processor daily using the most tolerated program and loaner hearing aid.  3. Return in 6-12 months for programming, optimization, and functional testing.   4. Utilize aural rehabilitation/auditory training programs, books on tape, and written materials at home to improve speech understanding ability.         Abebe Hanks, CCC-A    Time: 1473-2408

## 2024-12-05 ENCOUNTER — TELEMEDICINE (OUTPATIENT)
Dept: RHEUMATOLOGY | Facility: CLINIC | Age: 69
End: 2024-12-05
Payer: MEDICARE

## 2024-12-05 DIAGNOSIS — M35.3 PMR (POLYMYALGIA RHEUMATICA) (MULTI): Primary | ICD-10-CM

## 2024-12-05 PROCEDURE — 99214 OFFICE O/P EST MOD 30 MIN: CPT | Performed by: INTERNAL MEDICINE

## 2024-12-06 ENCOUNTER — SPECIALTY PHARMACY (OUTPATIENT)
Dept: PHARMACY | Facility: CLINIC | Age: 69
End: 2024-12-06

## 2024-12-06 ENCOUNTER — LAB (OUTPATIENT)
Dept: LAB | Facility: LAB | Age: 69
End: 2024-12-06
Payer: MEDICARE

## 2024-12-06 DIAGNOSIS — M35.3 POLYMYALGIA RHEUMATICA (MULTI): Primary | ICD-10-CM

## 2024-12-06 DIAGNOSIS — M35.3 PMR (POLYMYALGIA RHEUMATICA) (MULTI): ICD-10-CM

## 2024-12-06 LAB
ALBUMIN SERPL BCP-MCNC: 4.4 G/DL (ref 3.4–5)
ALP SERPL-CCNC: 48 U/L (ref 33–136)
ALT SERPL W P-5'-P-CCNC: 34 U/L (ref 10–52)
ANION GAP SERPL CALC-SCNC: 13 MMOL/L (ref 10–20)
AST SERPL W P-5'-P-CCNC: 20 U/L (ref 9–39)
BASOPHILS # BLD AUTO: 0.03 X10*3/UL (ref 0–0.1)
BASOPHILS NFR BLD AUTO: 0.4 %
BILIRUB SERPL-MCNC: 0.7 MG/DL (ref 0–1.2)
BUN SERPL-MCNC: 32 MG/DL (ref 6–23)
CALCIUM SERPL-MCNC: 9.8 MG/DL (ref 8.6–10.6)
CHLORIDE SERPL-SCNC: 105 MMOL/L (ref 98–107)
CK SERPL-CCNC: 159 U/L (ref 0–325)
CO2 SERPL-SCNC: 27 MMOL/L (ref 21–32)
CREAT SERPL-MCNC: 0.86 MG/DL (ref 0.5–1.3)
CRP SERPL-MCNC: 0.21 MG/DL
EGFRCR SERPLBLD CKD-EPI 2021: >90 ML/MIN/1.73M*2
EOSINOPHIL # BLD AUTO: 0.05 X10*3/UL (ref 0–0.7)
EOSINOPHIL NFR BLD AUTO: 0.6 %
ERYTHROCYTE [DISTWIDTH] IN BLOOD BY AUTOMATED COUNT: 11.9 % (ref 11.5–14.5)
ERYTHROCYTE [SEDIMENTATION RATE] IN BLOOD BY WESTERGREN METHOD: 10 MM/H (ref 0–20)
GLUCOSE SERPL-MCNC: 108 MG/DL (ref 74–99)
HBV SURFACE AG SERPL QL IA: NONREACTIVE
HCT VFR BLD AUTO: 44.2 % (ref 41–52)
HCV AB SER QL: NONREACTIVE
HGB BLD-MCNC: 14.7 G/DL (ref 13.5–17.5)
IMM GRANULOCYTES # BLD AUTO: 0.06 X10*3/UL (ref 0–0.7)
IMM GRANULOCYTES NFR BLD AUTO: 0.7 % (ref 0–0.9)
LYMPHOCYTES # BLD AUTO: 0.76 X10*3/UL (ref 1.2–4.8)
LYMPHOCYTES NFR BLD AUTO: 9.3 %
MCH RBC QN AUTO: 32.7 PG (ref 26–34)
MCHC RBC AUTO-ENTMCNC: 33.3 G/DL (ref 32–36)
MCV RBC AUTO: 98 FL (ref 80–100)
MONOCYTES # BLD AUTO: 0.53 X10*3/UL (ref 0.1–1)
MONOCYTES NFR BLD AUTO: 6.5 %
NEUTROPHILS # BLD AUTO: 6.73 X10*3/UL (ref 1.2–7.7)
NEUTROPHILS NFR BLD AUTO: 82.5 %
NRBC BLD-RTO: 0 /100 WBCS (ref 0–0)
PLATELET # BLD AUTO: 261 X10*3/UL (ref 150–450)
POTASSIUM SERPL-SCNC: 4.9 MMOL/L (ref 3.5–5.3)
PROT SERPL-MCNC: 7 G/DL (ref 6.4–8.2)
RBC # BLD AUTO: 4.5 X10*6/UL (ref 4.5–5.9)
SODIUM SERPL-SCNC: 140 MMOL/L (ref 136–145)
WBC # BLD AUTO: 8.2 X10*3/UL (ref 4.4–11.3)

## 2024-12-06 PROCEDURE — 86803 HEPATITIS C AB TEST: CPT

## 2024-12-06 PROCEDURE — 86140 C-REACTIVE PROTEIN: CPT

## 2024-12-06 PROCEDURE — 85025 COMPLETE CBC W/AUTO DIFF WBC: CPT

## 2024-12-06 PROCEDURE — 86481 TB AG RESPONSE T-CELL SUSP: CPT

## 2024-12-06 PROCEDURE — 80053 COMPREHEN METABOLIC PANEL: CPT

## 2024-12-06 PROCEDURE — 82550 ASSAY OF CK (CPK): CPT

## 2024-12-06 PROCEDURE — 87340 HEPATITIS B SURFACE AG IA: CPT

## 2024-12-06 PROCEDURE — 36415 COLL VENOUS BLD VENIPUNCTURE: CPT

## 2024-12-06 PROCEDURE — 85652 RBC SED RATE AUTOMATED: CPT

## 2024-12-06 NOTE — PROGRESS NOTES
Per Dr. Hoskins- This is a refractory PMR patient.   He needs 9 mg daily prednisone and methotrexate did not work.   Plan to start Kevzara and need PA.     Sent Kevzara rx to [facility] for PA.
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
## 2024-12-08 LAB
NIL(NEG) CONTROL SPOT COUNT: NORMAL
PANEL A SPOT COUNT: 0
PANEL B SPOT COUNT: 0
POS CONTROL SPOT COUNT: NORMAL
T-SPOT. TB INTERPRETATION: NEGATIVE

## 2024-12-09 ENCOUNTER — PATIENT MESSAGE (OUTPATIENT)
Dept: RHEUMATOLOGY | Facility: CLINIC | Age: 69
End: 2024-12-09
Payer: MEDICARE

## 2024-12-09 DIAGNOSIS — N52.9 ERECTILE DYSFUNCTION, UNSPECIFIED ERECTILE DYSFUNCTION TYPE: Primary | ICD-10-CM

## 2024-12-13 ENCOUNTER — LAB (OUTPATIENT)
Dept: LAB | Facility: LAB | Age: 69
End: 2024-12-13
Payer: MEDICARE

## 2024-12-13 DIAGNOSIS — N52.9 ERECTILE DYSFUNCTION, UNSPECIFIED ERECTILE DYSFUNCTION TYPE: ICD-10-CM

## 2024-12-13 PROCEDURE — 36415 COLL VENOUS BLD VENIPUNCTURE: CPT

## 2024-12-13 PROCEDURE — 84402 ASSAY OF FREE TESTOSTERONE: CPT

## 2024-12-19 LAB
TESTOSTERONE FREE (CHAN): 47.7 PG/ML (ref 30–135)
TESTOSTERONE,TOTAL,LC-MS/MS: 348 NG/DL (ref 250–1100)

## 2024-12-30 DIAGNOSIS — M35.3 POLYMYALGIA RHEUMATICA (MULTI): ICD-10-CM

## 2024-12-30 RX ORDER — PREDNISONE 1 MG/1
7 TABLET ORAL DAILY
Qty: 210 TABLET | Refills: 0 | Status: SHIPPED | OUTPATIENT
Start: 2024-12-30

## 2024-12-31 ENCOUNTER — APPOINTMENT (OUTPATIENT)
Dept: RHEUMATOLOGY | Facility: CLINIC | Age: 69
End: 2024-12-31
Payer: MEDICARE

## 2025-01-03 PROCEDURE — RXMED WILLOW AMBULATORY MEDICATION CHARGE

## 2025-01-06 ENCOUNTER — SPECIALTY PHARMACY (OUTPATIENT)
Dept: PHARMACY | Facility: CLINIC | Age: 70
End: 2025-01-06

## 2025-01-07 ENCOUNTER — PHARMACY VISIT (OUTPATIENT)
Dept: PHARMACY | Facility: CLINIC | Age: 70
End: 2025-01-07
Payer: COMMERCIAL

## 2025-01-09 ENCOUNTER — SPECIALTY PHARMACY (OUTPATIENT)
Dept: PHARMACY | Facility: CLINIC | Age: 70
End: 2025-01-09

## 2025-01-09 ENCOUNTER — TELEMEDICINE CLINICAL SUPPORT (OUTPATIENT)
Dept: PHARMACY | Facility: HOSPITAL | Age: 70
End: 2025-01-09
Payer: MEDICARE

## 2025-01-09 NOTE — PROGRESS NOTES
[facility] Clinical Note  Initial Patient Education     Introduction  Ulysses Murguia is a 69 y.o. male who is on the specialty pharmacy service for management of: Rheumatology Core.    Ulysses Murguia is initiating the following therapy: Kevzara 200mg under the skin every 14 days    Medication receipt date: 1/8/25  Duration of therapy: Until drug toxicity or progression    The most recent encounter visit with the referring prescriber Dr. Brandon Hoskins on 12/5/24 was reviewed.  Pharmacy will continue to collaborate in the care of this patient with the referring prescriber.    Clinical Background  An initial assessment was conducted prior to first fill of the medication to determine the appropriateness of therapy given the patient's diagnosis, medication list, comorbidities, allergies, medical history, patient's ability to self administer medication, and therapeutic goals based on possible outcomes of therapy. Refer to initial assessment task completed on 12/9/24.    Labs for clinical appropriateness that were reviewed include:   Rheumatology- CBC-diff:   Lab Results   Component Value Date    WBC 8.2 12/06/2024    RBC 4.50 12/06/2024    HGB 14.7 12/06/2024    HCT 44.2 12/06/2024    MCV 98 12/06/2024    MCHC 33.3 12/06/2024     12/06/2024    RDW 11.9 12/06/2024    NEUTOPHILPCT 82.5 12/06/2024    IGPCT 0.7 12/06/2024    LYMPHOPCT 9.3 12/06/2024    MONOPCT 6.5 12/06/2024    EOSPCT 0.6 12/06/2024    BASOPCT 0.4 12/06/2024    NEUTROABS 6.73 12/06/2024    LYMPHSABS 0.76 (L) 12/06/2024    MONOSABS 0.53 12/06/2024    EOSABS 0.05 12/06/2024    BASOSABS 0.03 12/06/2024   , CMP:   Lab Results   Component Value Date    GLUCOSE 108 (H) 12/06/2024     12/06/2024    K 4.9 12/06/2024     12/06/2024    CO2 27 12/06/2024    ANIONGAP 13 12/06/2024    BUN 32 (H) 12/06/2024    CREATININE 0.86 12/06/2024    CALCIUM 9.8 12/06/2024    ALBUMIN 4.4 12/06/2024    ALKPHOS 48 12/06/2024    PROT 7.0  12/06/2024    AST 20 12/06/2024    BILITOT 0.7 12/06/2024    ALT 34 12/06/2024   , TB:   Lab Results   Component Value Date    TBSIN Negative 12/06/2024   , Hepatitis B panel:   Lab Results   Component Value Date    HEPBSAG Nonreactive 12/06/2024   , LFTs and bilirubin:   Lab Results   Component Value Date    ALT 34 12/06/2024    AST 20 12/06/2024    ALKPHOS 48 12/06/2024    BILITOT 0.7 12/06/2024   , and Lipid panel:   Lab Results   Component Value Date    CHOL 231 (H) 07/28/2023    HDL 60.9 07/28/2023    CHHDL 3.8 07/28/2023    VLDL 36 07/28/2023    TRIG 178 (H) 07/28/2023       Education/Discussion  Ulysses was contacted on 1/9/2025 at 11:33 AM for a pharmacy visit with encounter number [id number] from:   [facility]  [address] [place] [id number]  Dept: [phone]  Dept Fax: [phone]  Ulysses consented to a/an Telephone visit, which was performed.    Medication Start Date (planned or actual): 1/9/25  Education was conducted prior to start of therapy? Yes    Education discussed includes the following:  Patient Education  Counseled the Patient on the Following : Expected duration of therapy, Adherence and missed doses, Theraputic rationale and expected outcomes, Doses and administration, Possible side effects and management, Possible drug interactions, Safe handling, storage, and disposal, Contraindications and precautions, Associated vaccinations  Learner: Patient, Significant other  Education Method: Explanation  Education Response: Verbalizes understanding  Additional details of the medication specific counseling are found within the linked patient education flowsheet.     The follow up timeline was discussed. Every person responds to and reacts to therapy differently. Patient should be assessed for efficacy and tolerability in approximately: 6 months    Provided education on goals and possible outcomes of therapy:  Adherence with therapy  Timely completion of  appropriate labs  Timely and appropriate follow up with provider  Identify and address medication interactions with presciption medications, OTC medications and supplements  Optimize or maintain quality of life  Rheumatology: Remission or low disease activity   Reduction of inflammation, joint pain, swelling, and morning stiffness    The importance of adherence was discussed and they were advised to take the medication as prescribed by their provider.     Impression/Plan  Review and Assessment   Reviewed During This Encounter: Allergies, Medications, Problem list, Immunizations  Medications Assessed for Appropriate Use, Dose, Route, Frequency, and Duration: Yes  Medication Reconciliation Completed: Yes  Drug Interactions Evaluated: Yes  Clinically Relevant Drug Interactions Identified: No    This patient has not been identified as high risk due to Lack of high risk qualifiers.  The following action was taken: N/A.    QOL/Patient Satisfaction  Rate your quality of life on scale of 1-10:  (N/A)  Rate your satisfaction with  Specialty Pharmacy on scale of 1-10:  (N/A)    The  Specialty Pharmacy Welcome packet may be viewed here:  [email or web address]       Or by scanning QR code:      Provided contact information ([phone]) for [facility] and reviewed dispensing process, refill timeline and patient management follow up. Advised to contact the pharmacy if there are any adverse effects and/or changes to medication list, including prescriptions, OTC medications, herbal products, or supplements. Confirmed understanding of education conducted during assessment. All questions and concerns were addressed and patient was encouraged to reach out for additional questions or concerns.      CATALINA CANTU, SusiD

## 2025-01-09 NOTE — PROGRESS NOTES
[facility] Clinical Note  Initial Patient Education     Introduction  Ulysses Murguia is a 69 y.o. male who is on the specialty pharmacy service for management of: Rheumatology Core.    Ulysses Murguia is initiating the following therapy: Kevzara 200mg under the skin every 14 days    Medication receipt date: 1/8/25  Duration of therapy: Until drug toxicity or progression    The most recent encounter visit with the referring prescriber Dr. Brandon Hoskins on 12/5/24 was reviewed.  Pharmacy will continue to collaborate in the care of this patient with the referring prescriber.    Clinical Background  An initial assessment was conducted prior to first fill of the medication to determine the appropriateness of therapy given the patient's diagnosis, medication list, comorbidities, allergies, medical history, patient's ability to self administer medication, and therapeutic goals based on possible outcomes of therapy. Refer to initial assessment task completed on 12/9/24.    Labs for clinical appropriateness that were reviewed include:   Rheumatology- CBC-diff:   Lab Results   Component Value Date    WBC 8.2 12/06/2024    RBC 4.50 12/06/2024    HGB 14.7 12/06/2024    HCT 44.2 12/06/2024    MCV 98 12/06/2024    MCHC 33.3 12/06/2024     12/06/2024    RDW 11.9 12/06/2024    NEUTOPHILPCT 82.5 12/06/2024    IGPCT 0.7 12/06/2024    LYMPHOPCT 9.3 12/06/2024    MONOPCT 6.5 12/06/2024    EOSPCT 0.6 12/06/2024    BASOPCT 0.4 12/06/2024    NEUTROABS 6.73 12/06/2024    LYMPHSABS 0.76 (L) 12/06/2024    MONOSABS 0.53 12/06/2024    EOSABS 0.05 12/06/2024    BASOSABS 0.03 12/06/2024   , CMP:   Lab Results   Component Value Date    GLUCOSE 108 (H) 12/06/2024     12/06/2024    K 4.9 12/06/2024     12/06/2024    CO2 27 12/06/2024    ANIONGAP 13 12/06/2024    BUN 32 (H) 12/06/2024    CREATININE 0.86 12/06/2024    CALCIUM 9.8 12/06/2024    ALBUMIN 4.4 12/06/2024    ALKPHOS 48 12/06/2024    PROT 7.0  12/06/2024    AST 20 12/06/2024    BILITOT 0.7 12/06/2024    ALT 34 12/06/2024   , TB:   Lab Results   Component Value Date    TBSIN Negative 12/06/2024   , Hepatitis B panel:   Lab Results   Component Value Date    HEPBSAG Nonreactive 12/06/2024   , LFTs and bilirubin:   Lab Results   Component Value Date    ALT 34 12/06/2024    AST 20 12/06/2024    ALKPHOS 48 12/06/2024    BILITOT 0.7 12/06/2024   , and Lipid panel:   Lab Results   Component Value Date    CHOL 231 (H) 07/28/2023    HDL 60.9 07/28/2023    CHHDL 3.8 07/28/2023    VLDL 36 07/28/2023    TRIG 178 (H) 07/28/2023       Education/Discussion  Ulysses was contacted on 1/9/2025 at 11:33 AM for a pharmacy visit with encounter number [id number] from:   [facility]  [address]  [facility] [address]  Dept: [phone]  Dept Fax: [phone]  Loc: [phone]  Ulysses consented to a/an Telephone visit, which was performed.    Medication Start Date (planned or actual): 1/9/25  Education was conducted prior to start of therapy? Yes    Education discussed includes the following:  Patient Education  Counseled the Patient on the Following : Expected duration of therapy, Adherence and missed doses, Theraputic rationale and expected outcomes, Doses and administration, Possible side effects and management, Possible drug interactions, Safe handling, storage, and disposal, Contraindications and precautions, Associated vaccinations  Learner: Patient, Significant other  Education Method: Explanation  Education Response: Verbalizes understanding  Additional details of the medication specific counseling are found within the linked patient education flowsheet.     The follow up timeline was discussed. Every person responds to and reacts to therapy differently. Patient should be assessed for efficacy and tolerability in approximately: 6 months    Provided education on goals and possible outcomes of  therapy:  Adherence with therapy  Timely completion of appropriate labs  Timely and appropriate follow up with provider  Identify and address medication interactions with presciption medications, OTC medications and supplements  Optimize or maintain quality of life  Rheumatology: Remission or low disease activity   Reduction of inflammation, joint pain, swelling, and morning stiffness    The importance of adherence was discussed and they were advised to take the medication as prescribed by their provider.     Impression/Plan  Review and Assessment   Reviewed During This Encounter: Allergies, Medications, Problem list, Immunizations  Medications Assessed for Appropriate Use, Dose, Route, Frequency, and Duration: Yes  Medication Reconciliation Completed: Yes  Drug Interactions Evaluated: Yes  Clinically Relevant Drug Interactions Identified: No    This patient has not been identified as high risk due to Lack of high risk qualifiers.  The following action was taken: N/A.    QOL/Patient Satisfaction  Rate your quality of life on scale of 1-10:  (N/A)  Rate your satisfaction with  Specialty Pharmacy on scale of 1-10:  (N/A)    The  Specialty Pharmacy Welcome packet may be viewed here:  [email or web address]       Or by scanning QR code:      Provided contact information ([phone]) for [facility] and reviewed dispensing process, refill timeline and patient management follow up. Advised to contact the pharmacy if there are any adverse effects and/or changes to medication list, including prescriptions, OTC medications, herbal products, or supplements. Confirmed understanding of education conducted during assessment. All questions and concerns were addressed and patient was encouraged to reach out for additional questions or concerns.      CATALINA CANTU, SusiD

## 2025-01-23 PROCEDURE — RXMED WILLOW AMBULATORY MEDICATION CHARGE

## 2025-01-27 ENCOUNTER — SPECIALTY PHARMACY (OUTPATIENT)
Dept: PHARMACY | Facility: CLINIC | Age: 70
End: 2025-01-27

## 2025-01-27 DIAGNOSIS — M35.3 POLYMYALGIA RHEUMATICA (MULTI): ICD-10-CM

## 2025-01-27 RX ORDER — PREDNISONE 5 MG/1
10 TABLET ORAL DAILY
Qty: 180 TABLET | Refills: 1 | Status: SHIPPED | OUTPATIENT
Start: 2025-01-27

## 2025-01-30 ENCOUNTER — TELEPHONE (OUTPATIENT)
Dept: RHEUMATOLOGY | Facility: CLINIC | Age: 70
End: 2025-01-30
Payer: MEDICARE

## 2025-01-30 NOTE — TELEPHONE ENCOUNTER
Call placed to [facility] to clarify prescribed Prednisone order. This nurse spoke to vinicius Clemente and gave updated information and answered all questions, including those of previous dosing. Medication to be sent out per pharmacist due to clarification given. Nothing additional to address at this time.

## 2025-02-03 ENCOUNTER — SPECIALTY PHARMACY (OUTPATIENT)
Dept: PHARMACY | Facility: CLINIC | Age: 70
End: 2025-02-03

## 2025-02-04 ENCOUNTER — PHARMACY VISIT (OUTPATIENT)
Dept: PHARMACY | Facility: CLINIC | Age: 70
End: 2025-02-04
Payer: COMMERCIAL

## 2025-02-21 PROCEDURE — RXMED WILLOW AMBULATORY MEDICATION CHARGE

## 2025-03-04 ENCOUNTER — PHARMACY VISIT (OUTPATIENT)
Dept: PHARMACY | Facility: CLINIC | Age: 70
End: 2025-03-04
Payer: COMMERCIAL

## 2025-03-26 ENCOUNTER — SPECIALTY PHARMACY (OUTPATIENT)
Dept: PHARMACY | Facility: CLINIC | Age: 70
End: 2025-03-26

## 2025-03-26 PROCEDURE — RXMED WILLOW AMBULATORY MEDICATION CHARGE

## 2025-04-01 ENCOUNTER — PHARMACY VISIT (OUTPATIENT)
Dept: PHARMACY | Facility: CLINIC | Age: 70
End: 2025-04-01
Payer: COMMERCIAL

## 2025-04-10 DIAGNOSIS — M35.3 POLYMYALGIA RHEUMATICA (MULTI): ICD-10-CM

## 2025-04-10 RX ORDER — PREDNISONE 1 MG/1
7 TABLET ORAL DAILY
Qty: 210 TABLET | Refills: 0 | Status: SHIPPED | OUTPATIENT
Start: 2025-04-10

## 2025-04-16 ENCOUNTER — SPECIALTY PHARMACY (OUTPATIENT)
Dept: PHARMACY | Facility: CLINIC | Age: 70
End: 2025-04-16

## 2025-04-16 DIAGNOSIS — M35.3 POLYMYALGIA RHEUMATICA (MULTI): ICD-10-CM

## 2025-04-16 RX ORDER — SARILUMAB 200 MG/1.14ML
200 INJECTION, SOLUTION SUBCUTANEOUS
Qty: 2.28 ML | Refills: 2 | Status: SHIPPED | OUTPATIENT
Start: 2025-04-16

## 2025-04-23 ENCOUNTER — SPECIALTY PHARMACY (OUTPATIENT)
Dept: PHARMACY | Facility: CLINIC | Age: 70
End: 2025-04-23

## 2025-04-23 PROCEDURE — RXMED WILLOW AMBULATORY MEDICATION CHARGE

## 2025-04-28 ENCOUNTER — CLINICAL SUPPORT (OUTPATIENT)
Dept: AUDIOLOGY | Facility: CLINIC | Age: 70
End: 2025-04-28
Payer: MEDICARE

## 2025-04-28 DIAGNOSIS — Z96.21 COCHLEAR IMPLANT IN PLACE: ICD-10-CM

## 2025-04-28 DIAGNOSIS — H90.3 SENSORINEURAL HEARING LOSS (SNHL) OF BOTH EARS: Primary | ICD-10-CM

## 2025-04-28 PROCEDURE — 92626 EVAL AUD FUNCJ 1ST HOUR: CPT | Mod: 59 | Performed by: AUDIOLOGIST

## 2025-04-28 PROCEDURE — 92604 REPROGRAM COCHLEAR IMPLT 7/>: CPT | Performed by: AUDIOLOGIST

## 2025-04-28 NOTE — PROGRESS NOTES
Cochlear Implant Programming and Hearing Aid Check    Name:  Ulysses Murguia  :  1955  Age:  69 y.o.  Date of Evaluation:  25           Ear Make and Model Serial Number /Tube size Dome Warranty Expiration   Left ReSound Nexia [id number]   length 2M Custom EM 2027     RIGHT DEVICE  External Device: Cochlear [id number] (N8) sound processor with acoustic component  Internal Device: Cochlear [employer]   Surgeon: Tabatha Pelayo MD  Implantation Date: 2023  LEFT DEVICE  Resound Nexia #[id number] with custom earmold  length 2M  Warranty expires 2027    HISTORY  Ulysses Murguia arrives today for program optimization of his right cochlear implant and left hearing aid check of his Resound Nexia MAGNUS device. The device needs to be sent in for repair to replace the top housing/spine with the updated style to allow for easy ericka cover replacements by patient. He will maintain use of the clinic loaner Omnia while we send in the Nexia again. Clinic loaner left Resound Omnia MAGNUS hearing aid (SN [id number]) remains in use by patient today. Custom earmold in use.     Dates of previous hearing aid service listed below:  4/10/24 presented with non-working hearing aid  24 dispensed repaired hearing aid  24 presented with non-working hearing aid  24 dispensed NEW replacement hearing aid new #[id number] (sent previous aid to )  24 presented with non-working hearing aid   2024 dispensed repaired hearing aid while MARIA FERNANDA Quinteros was out of office  9/10/2024 presented with non-working hearing aid  10/15/2024 dispensed repaired Nexia MAGNUS device, pt returned clinic Omnia loaner  24 presented with non-working hearing aid, given Omnia clinic loaner  24 dispensed NEW replacement hearing aid new #[id number] (did not have time to pair to phone, accessories; patient leaving for [place] for the winter)     Recall, Ulysses Murguia has a documented history of  severe to profound sensorineural hearing loss, bilaterally.    Evaluation:    Otoscopy revealed clear ear canals bilaterally.    Immittance testing revealed normal middle ear function bilaterally.  Right ear (implanted): severe sensorineural hearing loss with good word discrimination (84%) unaided  Left ear: moderate to severe sensorineural hearing loss with good word discrimination (80%) unaided    Otoscopy revealed clear ear canals bilaterally.  Immittance testing revealed normal middle ear function bilaterally with present acoustic reflexes.  Distortion product otoacoustic emissions are present for 3609-2158 Hz bilaterally.  Audiological results reveal normal hearing sensitivity with excellent word discrimination bilaterally.  Results are consistent with normal middle ear function bilaterally, normal hearing sensitivity bilaterally.       PROGRAMMING  Headset pressure, magnet strength (4I), and incision site were checked with no problems noted. Datalogging revealed 13+ hours of use per day with 2+ hours in speech, on average since the last appointment; mostly in SCAN 2 Fwd Focus; TV Streamer, Mini Romulo and mobile device use reflected     A length 2 acoustic  remains attached to the N8 processor today. Wax trap on acoustic component  replaced today due to visible debris clogging it.     Electrode impedances, used to monitor internal device function, were measured across the electrode array.  Impedance measures were within normal limits for all electrodes, in each stimulation mode. Current parameters of MP1+2 stimulation mode, an ACE speech processing strategy, 8 maxima, and a 900 Hz stimulation rate were maintained.  Threshold (T) levels were measuring using up-down bracketing method on a variety of channels with the remaining channels interpolated. Comfort (C) levels were measuring using psychophysical loudness scaling method on a variety of channels with the remaining channels interpolated. No  facial stimulation or non-auditory stimulation observed during live speech. Current programming consists of:    Program 1 - SCAN 2  Program 2 - SCAN 2 w/ Forward Focus  Program 3 - Restaurant  Program 4 - Groups     Changed acoustic cutoff frequency to 1225 Hz from 2449 Hz. Patient reported improved sound quality following this adjustment.    PERFORMANCE VERIFICATION  Functional gain was assessed while patient wore the [id number] EAS processor in P1 following reprogramming. Responses to narrowband noise ranged from 25-30 dBHL for 500-6000 Hz. Word discrimination was assessed using CNC words and [employer] sentences presented at 50 dBHL via recorded text.      RIGHT CNC (@50 dB)=64%  RIGHT [employer] in quiet (@50 dB) =70%  RIGHT [employer] in noise (@50dB with 40 dB speech babble) =13%    *Left ear plugged during testing      RECOMMENDATIONS  1. Continue medical follow-up with your neuro-otologist Dr. Pelayo.  2. Utilize the Cochlear [employer] Nucleus Cochlear [id number] (N8) sound processor daily using the most tolerated program and loaner hearing aid.  3. Return in 6-12 months for programming, optimization, and functional testing.   4. Utilize aural rehabilitation/auditory training programs, books on tape, and written materials at home to improve speech understanding ability.       Abebe Hanks, CCC-A    Time: [phone]

## 2025-04-29 ENCOUNTER — PHARMACY VISIT (OUTPATIENT)
Dept: PHARMACY | Facility: CLINIC | Age: 70
End: 2025-04-29
Payer: COMMERCIAL

## 2025-04-29 DIAGNOSIS — M35.3 POLYMYALGIA RHEUMATICA (MULTI): ICD-10-CM

## 2025-04-29 RX ORDER — PREDNISONE 1 MG/1
TABLET ORAL
Qty: 210 TABLET | Refills: 11 | Status: SHIPPED | OUTPATIENT
Start: 2025-04-29

## 2025-04-30 ENCOUNTER — OFFICE VISIT (OUTPATIENT)
Dept: RHEUMATOLOGY | Facility: CLINIC | Age: 70
End: 2025-04-30
Payer: MEDICARE

## 2025-04-30 VITALS
DIASTOLIC BLOOD PRESSURE: 64 MMHG | WEIGHT: 198.8 LBS | SYSTOLIC BLOOD PRESSURE: 132 MMHG | OXYGEN SATURATION: 93 % | HEART RATE: 71 BPM | BODY MASS INDEX: 26.23 KG/M2 | TEMPERATURE: 98.7 F

## 2025-04-30 DIAGNOSIS — M35.3 POLYMYALGIA RHEUMATICA (MULTI): ICD-10-CM

## 2025-04-30 DIAGNOSIS — M35.3 PMR (POLYMYALGIA RHEUMATICA) (MULTI): Primary | ICD-10-CM

## 2025-04-30 PROCEDURE — 99214 OFFICE O/P EST MOD 30 MIN: CPT | Performed by: INTERNAL MEDICINE

## 2025-04-30 PROCEDURE — 1126F AMNT PAIN NOTED NONE PRSNT: CPT | Performed by: INTERNAL MEDICINE

## 2025-04-30 RX ORDER — SARILUMAB 200 MG/1.14ML
200 INJECTION, SOLUTION SUBCUTANEOUS
Qty: 2.28 ML | Refills: 2 | Status: SHIPPED | OUTPATIENT
Start: 2025-04-30

## 2025-04-30 ASSESSMENT — PAIN SCALES - GENERAL: PAINLEVEL_OUTOF10: 0-NO PAIN

## 2025-04-30 NOTE — PROGRESS NOTES
Subjective   Patient ID: Ulysses Murguia is a 70 y.o. male who presents for Follow-up (fuv).    HPI  This is a 70 yo male, is here as a new patient with the chief complain of bilateral proximal lower extremity pain and weakness for 3 years.   Patient says that he started having pain and weakness in his thighs about 2-3 years ago. He had trouble getting up from a chair, getting up from the toilet seat. He was first seen by an orthopedic surgeon and initially thought his symptoms were due to swollen knees. But after doing x-rays of his knees he was told that his symptoms are not due to knee problem, but most likely due to PMR. Then he was seen by a nurse practitioner around 02/2021 and started him on prednisone course (15mgx 5d, 10mgx 5d, 5mgx 5d)for which he responded fast. He felt better the next day. Once he stopped prednisone he did ok for a while. But around spring of 2022 his symptoms came back and this time it was severe. He was seen by multiple rheumatologist at [facility] and was told he does not have PMR and was sent home. Around 01/2023 his symptoms got worsen to the point he couldn't again get up from a seated position. He was prescribed a course of prednisone by a general practitioner at [place]. HE was on prednisone 15mg for few days, then 10mg for few days and then stayed for 5mg for few weeks. But while on prednisone 5mg around 04/2023 his symptoms got worse. So he himself increased the dose to 15mg for 5 days and now on 10mg for the last 3 days.  He denies any significant pain or weakness in his upper extremities. Denies any headaches, blurry vision, jaw pain, numbness or tingling. Denies any joint pain or swelling. AM stiffness lasts for one hour. He also c/o trouble opening jars.   He has a hx of fall in the boat and had a fracture in his lumbar spine in the past.   He had extensive blood work in the past which showed normal ESR, CPK, AST, ALT, aldolase. He only had one positive CRP of 1.6 on 10/2022, rest  of the labs were normal. Hs has been on a statin since 10/2022.      07/23  When he tapered his PRD to 10 mg, his symptoms came back. He called me and we increased his PRD dose to 12.5 mg daily.  Today, he is doing better. He reports only mild thigh pain, but no stiffness.  He denies any cranial symptoms, visual problem.     PMHx: HTN, HLD, hearing loss, hx of thrombophlebitis and PE  Allergies: NKDA  Family hx: no family hx of autoimmune diseses  Social hx: denies smoking, alcohol or other recreational drugs, wife is a retired anesthesiologist      Interval history:  In 10/23, he tapered his PRD dose to 8 mg daily, but he had a COVID-19 infection in November and then he had a flare with hip, shoulder pain and AM stiffness. We increased his PRD dose to 15 mg daily and his symptoms improved.     05/24:  He tapered down his PRD, but did not tolerate it due to a flare.  Now, he is using PRD 13 mg daily.  His main problem is weakness, not proximal pain or stiffness.  He has been using MTX 17.5 mg week since 02/24     Interval history:  He could not taper down his PRD lower than 8 mg daily, still using 9 mg  We started Kevzara, but still needs to use PRD 9 mg daily PRD     Meds:  MTX 17.5 mg week, since 02/24, stopped it in 12/24  PRD 8 mg daily  Folic acid 1 mg daily  Kevzara    ROS  Joint pain in hands: negative   Joint swelling: negative  Morning stiffness and duration: negative   strength: normal  Oral ulcer: negative  Genital ulcer: negative  Raynaud phenomenon: negative  Chest pain/dyspnea: negative  Low back pain: negative  Visual problem: negative  Dry eyes/dry mouth: negative  Skin rash/scaling/psoriasis: negative       Objective     PEXAM  VS reviewed, WNL  General: Alert, no distress   HEENT: Normocephalic/atraumatic, No alopecia. PERRLA. Sclera white, conjunctiva pink, no malar rash. no oral or nasal ulcer. Oral cavity pink and moist, no erythema or exudate, dentition good.   Neck: supple  Respiratory:  CTA B, no adventitious breath sounds  Cardiac: RRR, no murmurs, carotid, or bruits  Abdominal: symmetrical, soft, non-tender, non-distended, normoactive BSx4 quadrants, no CVA tenderness or suprapubic tenderness  MSK: Joints of upper and lower extremities were assessed for synovitis and ROM.    Today she has no evidence of synovitis in the joints of her hands or wrists, tender joint count 0, swollen joint count 0   Extremities: no clubbing, no cyanosis, no edema  Skin: Skin warm and moist.   Neuro: non-focal, Strength 5/5 throughout. Normal gait. No cerebellar pathologic exam     Assessment/Plan   He has some positive symptoms and signs suggestive of PMR including pain in thighs responding to prednisone, one time elevated CRP in 10/2022. But he also has some atypical symptoms. He has weakness which is not very typical for PMR and his ESR, CRP, CPK has been normal most of the times which argues against PMR.  He does not have any GCA symptoms.      He labs showed normal ESR, CPK, AST, ALT, aldolase. He only had one positive CRP of 1.6 on 10/2022, rest of the labs were normal. Hs has been on a statin since 10/2022.     At this time his diagnosis is unclear. But because his symptoms are responding to prednisone we will treat empirically for possible PMR with longer prednisone taper. He stopped statin treatment due to possible statin associated myalgia. But, he has not noted any changes.   Overall, he was diagnosed with PMR before and steroid significantly helped his pain. Then, he stopped PRD and he had severe symptoms, he started PRD again. We saw him and tried steroid tapering, but could not tolerate tapering after 12.5 mg.  His PMR is atypical, it presented with lower extremity proximal symptoms and knee pain, swelling, no upper extremity symptoms, cranial symptoms or peripheral arthritis, classical morning stiffness. Also, his acute phases were normal. Overall, his PMR diagnosis is suspicious, but responded steroid  before and we decided to continue steroid and try tapering down. In 10/23, he tapered down his PRD to 8 mg daily, but he had a COVID-19 infection in 11/23 and his symptoms flared. We increased his PRD dose to 15 mg and his hip, shoulder pain, stiffness improved.  In 02/24, we added MTX in his treatment and then increased its dose to 17.5 mg week.  He could not taper down his PRD lower than 8 mg despite adding MTX 9 months ago.    04/25:  We added Kevzara in his treatment, but did not taper down his PRD lower than 8-9 mg daily  -will check his lab  -will use Kevzara once a week, will try taper down his PRD slowly  -will see him in 3-4 months

## 2025-05-01 LAB
ALBUMIN SERPL-MCNC: 4.4 G/DL (ref 3.6–5.1)
ALP SERPL-CCNC: 38 U/L (ref 35–144)
ALT SERPL-CCNC: 55 U/L (ref 9–46)
ANION GAP SERPL CALCULATED.4IONS-SCNC: 7 MMOL/L (CALC) (ref 7–17)
AST SERPL-CCNC: 30 U/L (ref 10–35)
BASOPHILS # BLD AUTO: 10 CELLS/UL (ref 0–200)
BASOPHILS NFR BLD AUTO: 0.2 %
BILIRUB SERPL-MCNC: 0.7 MG/DL (ref 0.2–1.2)
BUN SERPL-MCNC: 32 MG/DL (ref 7–25)
CALCIUM SERPL-MCNC: 9.1 MG/DL (ref 8.6–10.3)
CHLORIDE SERPL-SCNC: 107 MMOL/L (ref 98–110)
CO2 SERPL-SCNC: 26 MMOL/L (ref 20–32)
CREAT SERPL-MCNC: 0.83 MG/DL (ref 0.7–1.28)
CRP SERPL-MCNC: <3 MG/L
EGFRCR SERPLBLD CKD-EPI 2021: 94 ML/MIN/1.73M2
EOSINOPHIL # BLD AUTO: 10 CELLS/UL (ref 15–500)
EOSINOPHIL NFR BLD AUTO: 0.2 %
ERYTHROCYTE [DISTWIDTH] IN BLOOD BY AUTOMATED COUNT: 11.5 % (ref 11–15)
ERYTHROCYTE [SEDIMENTATION RATE] IN BLOOD BY WESTERGREN METHOD: 2 MM/H
GLUCOSE SERPL-MCNC: 133 MG/DL (ref 65–99)
HCT VFR BLD AUTO: 43.6 % (ref 38.5–50)
HGB BLD-MCNC: 14.7 G/DL (ref 13.2–17.1)
LYMPHOCYTES # BLD AUTO: 681 CELLS/UL (ref 850–3900)
LYMPHOCYTES NFR BLD AUTO: 13.9 %
MCH RBC QN AUTO: 33 PG (ref 27–33)
MCHC RBC AUTO-ENTMCNC: 33.7 G/DL (ref 32–36)
MCV RBC AUTO: 97.8 FL (ref 80–100)
MONOCYTES # BLD AUTO: 588 CELLS/UL (ref 200–950)
MONOCYTES NFR BLD AUTO: 12 %
NEUTROPHILS # BLD AUTO: 3611 CELLS/UL (ref 1500–7800)
NEUTROPHILS NFR BLD AUTO: 73.7 %
PLATELET # BLD AUTO: 206 THOUSAND/UL (ref 140–400)
PMV BLD REES-ECKER: 10.5 FL (ref 7.5–12.5)
POTASSIUM SERPL-SCNC: 4.1 MMOL/L (ref 3.5–5.3)
PROT SERPL-MCNC: 6.5 G/DL (ref 6.1–8.1)
RBC # BLD AUTO: 4.46 MILLION/UL (ref 4.2–5.8)
SODIUM SERPL-SCNC: 140 MMOL/L (ref 135–146)
WBC # BLD AUTO: 4.9 THOUSAND/UL (ref 3.8–10.8)

## 2025-05-15 DIAGNOSIS — M35.3 POLYMYALGIA RHEUMATICA (MULTI): ICD-10-CM

## 2025-05-15 PROCEDURE — RXMED WILLOW AMBULATORY MEDICATION CHARGE

## 2025-05-15 RX ORDER — SARILUMAB 200 MG/1.14ML
200 INJECTION, SOLUTION SUBCUTANEOUS
Qty: 4.56 ML | Refills: 2 | Status: SHIPPED | OUTPATIENT
Start: 2025-05-15

## 2025-05-15 NOTE — PROGRESS NOTES
Per Dr. Hoskins:   This is a PMR patient.   Needs to ana rosa high dose PRD despite Kevzara every other week.   Can we get approval for every week Kevzara?

## 2025-05-16 ENCOUNTER — SPECIALTY PHARMACY (OUTPATIENT)
Dept: PHARMACY | Facility: CLINIC | Age: 70
End: 2025-05-16

## 2025-05-17 ENCOUNTER — PHARMACY VISIT (OUTPATIENT)
Dept: PHARMACY | Facility: CLINIC | Age: 70
End: 2025-05-17
Payer: COMMERCIAL

## 2025-05-19 ENCOUNTER — CLINICAL SUPPORT (OUTPATIENT)
Dept: AUDIOLOGY | Facility: CLINIC | Age: 70
End: 2025-05-19

## 2025-05-19 DIAGNOSIS — H90.3 SENSORINEURAL HEARING LOSS (SNHL) OF BOTH EARS: Primary | ICD-10-CM

## 2025-05-19 DIAGNOSIS — Z96.21 COCHLEAR IMPLANT IN PLACE: ICD-10-CM

## 2025-05-19 NOTE — PROGRESS NOTES
Cochlear Implant Programming and Hearing Aid Check    Name:  Ulysses Murguia  :  1955  Age:  69 y.o.  Date of Evaluation:  25           Ear Make and Model Serial Number /Tube size Dome Warranty Expiration   Left ReSound Nexia [id number]   length 2M Custom EM 2027     RIGHT DEVICE  External Device: Cochlear [id number] (N8) sound processor with acoustic component  Internal Device: Cochlear [employer]   Surgeon: Tabatha Pelayo MD  Implantation Date: 2023  LEFT DEVICE  Resound Nexia #[id number] with custom earmold  length 2M  Warranty expires 2027    HISTORY  Ulysses Murguia arrives today for dispensing of repaired Nexia MAGNUS hearing aid.  The device was sent in for repair to replace the top housing/spine with the updated style to allow for easy ericka cover replacements by patient. He will maintain possession of the clinic loaner Omnia for the near future while we make sure the Nexia is operational. Clinic loaner left Resound Omnia MAGNUS hearing aid (SN [id number]) remains in possession by patient today.      Dates of previous hearing aid service listed below:  4/10/24 presented with non-working hearing aid  24 dispensed repaired hearing aid  24 presented with non-working hearing aid  24 dispensed NEW replacement hearing aid new #[id number] (sent previous aid to )  24 presented with non-working hearing aid   2024 dispensed repaired hearing aid while MARIA FERNANDA Quinteros was out of office  9/10/2024 presented with non-working hearing aid  10/15/2024 dispensed repaired Nexia MAGNUS device, pt returned clinic Omnia loaner  24 presented with non-working hearing aid, given Omnia clinic loaner  24 dispensed NEW replacement hearing aid new #[id number] (did not have time to pair to phone, accessories; patient leaving for [place] for the winter)  Today 25 dispensed repaired replacement hearing aid (same SN [id number]) with new spine providing  replaceable microphone filters     Recall, Ulysses Murguia has a documented history of severe to profound sensorineural hearing loss, bilaterally.    Attached repaired Nexia MAGNUS to his custom earmold. Linked Nexia to the Nucleus 8 processor in CustomSound. Re-paired bimodal devices to his Mini Microphone, TV Streamer, and iPhone. He will handle his iPad and alternate TV Streamer on his own.        RECOMMENDATIONS  1. Continue medical follow-up with your neuro-otologist Dr. Pelayo.  2. Utilize the Cochlear [employer] Nucleus Cochlear [id number] (N8) sound processor daily using the most tolerated program and loaner hearing aid.  3. Return in 6-12 months for programming, optimization, and functional testing.   4. Utilize aural rehabilitation/auditory training programs, books on tape, and written materials at home to improve speech understanding ability.       Abebe Hanks, CCC-A    Time: 031-550

## 2025-06-10 ENCOUNTER — SPECIALTY PHARMACY (OUTPATIENT)
Dept: PHARMACY | Facility: CLINIC | Age: 70
End: 2025-06-10

## 2025-06-10 PROCEDURE — RXMED WILLOW AMBULATORY MEDICATION CHARGE

## 2025-06-16 ENCOUNTER — PHARMACY VISIT (OUTPATIENT)
Dept: PHARMACY | Facility: CLINIC | Age: 70
End: 2025-06-16
Payer: COMMERCIAL

## 2025-07-07 ENCOUNTER — SPECIALTY PHARMACY (OUTPATIENT)
Dept: PHARMACY | Facility: CLINIC | Age: 70
End: 2025-07-07

## 2025-07-07 PROCEDURE — RXMED WILLOW AMBULATORY MEDICATION CHARGE

## 2025-07-09 ENCOUNTER — APPOINTMENT (OUTPATIENT)
Dept: AUDIOLOGY | Facility: CLINIC | Age: 70
End: 2025-07-09
Payer: MEDICARE

## 2025-07-15 ENCOUNTER — PHARMACY VISIT (OUTPATIENT)
Dept: PHARMACY | Facility: CLINIC | Age: 70
End: 2025-07-15
Payer: COMMERCIAL

## 2025-07-18 DIAGNOSIS — M35.3 POLYMYALGIA RHEUMATICA (MULTI): ICD-10-CM

## 2025-07-18 RX ORDER — PREDNISONE 5 MG/1
5 TABLET ORAL DAILY
Qty: 60 TABLET | Refills: 5 | Status: SHIPPED | OUTPATIENT
Start: 2025-07-18

## 2025-07-21 DIAGNOSIS — M35.3 POLYMYALGIA RHEUMATICA (MULTI): ICD-10-CM

## 2025-07-21 RX ORDER — PREDNISONE 5 MG/1
5 TABLET ORAL DAILY
Qty: 90 TABLET | Refills: 0 | Status: SHIPPED | OUTPATIENT
Start: 2025-07-21 | End: 2025-07-22 | Stop reason: SDUPTHER

## 2025-07-22 DIAGNOSIS — M35.3 POLYMYALGIA RHEUMATICA (MULTI): ICD-10-CM

## 2025-07-22 RX ORDER — PREDNISONE 5 MG/1
10 TABLET ORAL DAILY
Qty: 180 TABLET | Refills: 0 | Status: SHIPPED | OUTPATIENT
Start: 2025-07-22 | End: 2025-10-20

## 2025-07-23 ENCOUNTER — CLINICAL SUPPORT (OUTPATIENT)
Dept: AUDIOLOGY | Facility: CLINIC | Age: 70
End: 2025-07-23

## 2025-07-23 DIAGNOSIS — H90.3 SENSORINEURAL HEARING LOSS (SNHL) OF BOTH EARS: Primary | ICD-10-CM

## 2025-07-23 DIAGNOSIS — Z96.21 COCHLEAR IMPLANT IN PLACE: ICD-10-CM

## 2025-07-23 PROCEDURE — 92604 REPROGRAM COCHLEAR IMPLT 7/>: CPT | Performed by: AUDIOLOGIST

## 2025-07-23 NOTE — PROGRESS NOTES
Cochlear Implant Programming and Hearing Aid Check    Name:  Ulysses Murguia  :  1955  Age:  69 y.o.  Date of Evaluation:  25           Ear Make and Model Serial Number /Tube size Dome Warranty Expiration   Left ReSound Nexia [id number]   length 2M Custom EM 2027     RIGHT DEVICE  External Device: Cochlear [id number] (N8) sound processor with acoustic component  Internal Device: Cochlear [employer]   Surgeon: Tabatha Pelayo MD  Implantation Date: 2023  LEFT DEVICE  Resound Nexia #[id number] with custom earmold  length 2M  Warranty expires 2027    HISTORY  Ulysses Murguia arrives today reporting poor sound quality/static from his Nexia MAGNUS hearing aid.  The device was sent in for repair to replace the top housing/spine with the updated style to allow for easy ericka cover replacements by patient. We attempted to replace these in the office today, but the ericka covers will not stay properly inserted into the ericka ports. Replaced  from clinic stock. Patient would still like device sent in for in-warranty repair.    He will maintain possession of the clinic loaner Omnia for the near future while we make sure the Nexia is operational. Clinic loaner left Resound Omnia MAGNUS hearing aid (SN [id number]) remains in possession by patient today.      Dates of previous hearing aid service listed below:  4/10/24 presented with non-working hearing aid  24 dispensed repaired hearing aid  24 presented with non-working hearing aid  24 dispensed NEW replacement hearing aid new #[id number] (sent previous aid to )  24 presented with non-working hearing aid   2024 dispensed repaired hearing aid while PURAMee Slaterdeshawnjuan miguel was out of office  9/10/2024 presented with non-working hearing aid  10/15/2024 dispensed repaired Nexia MAGNUS device, pt returned clinic Omnia loaner  24 presented with non-working hearing aid, given Omnia clinic loaner  24  dispensed NEW replacement hearing aid new #[id number] (did not have time to pair to phone, accessories; patient leaving for [place] for the winter)  5/19/25 dispensed repaired replacement hearing aid (same SN [id number]) with new spine providing replaceable microphone filters  TODAY 7/23/25 sending Nexia MAGNUS device with new spine cover/replaceable ericka cover in for repair - unsuccessful attempts at replacing ericka covers in office today; patient reports distorted ericka sound     Recall, Ulysses Murguia has a documented history of severe to profound sensorineural hearing loss, bilaterally.      Attached backup loaner Omnia MAGNUS to his custom earmold. Linked Omnia to the Nucleus 8 processor in CustomSound. Re-paired bimodal devices to his Mini Microphone, and iPhone. He will handle his iPad and TV Streamer on his own. Patient kept his second custom earmold in the charging case and should bring to next visit when repair is picked up.    Impedance check satisfactory. Increased C-levels globally by 4 units per patient request.       RECOMMENDATIONS  1. Continue medical follow-up with your neuro-otologist Dr. Pelayo.  2. Utilize the [place] Nucleus Cochlear QC0398 (N8) sound processor daily using the most tolerated program and loaner hearing aid.  3. Return as scheduled for programming, optimization, and functional testing and hearing aid repairs.   4. Utilize aural rehabilitation/auditory training programs, books on tape, and written materials at home to improve speech understanding ability.       Abebe Hanks, CCC-A    Time: [phone]

## 2025-07-24 ENCOUNTER — SPECIALTY PHARMACY (OUTPATIENT)
Dept: PHARMACY | Facility: CLINIC | Age: 70
End: 2025-07-24

## 2025-07-24 NOTE — PROGRESS NOTES
[facility] Clinical Note  Patient Reassessment     Introduction  Ulysses Murguia is a 70 y.o. male who is on the specialty pharmacy service for management of: Rheumatology Core.      [facility] supplied medication: kevzara 200 mg weekly        Duration of therapy: Maintenance    The most recent encounter visit with the referring prescriber Dr Hoskins on 4/30/25 was reviewed.  Pharmacy will continue to collaborate in the care of this patient with the referring prescriber.    Discussion  Ulysses was contacted on 7/24/2025 at 12:43 PM for a pharmacy visit with encounter number [id number] from:   [facility]  [address] [place] [id number]  Dept: [phone]  Dept Fax: [phone]  Ulysses consented to a/an Telephone visit, which was performed.    Efficacy  Patient has developed new symptoms of condition: No  Patient/caregiver feels medication is affecting the disease state: patient is unsure    Goals  Provided education on goals and possible outcomes of therapy:  Adherence with therapy  Timely completion of appropriate labs  Timely and appropriate follow up with provider  Identify and address medication interactions with presciption medications, OTC medications and supplements  Optimize or maintain quality of life  Rheumatology: Remission or low disease activity  Patient has documented target(s) for goals of therapy: Yes  Patient status for goal(s): On track    Targets       Target Due Completed Completed By Outcome Source     Goal:  Prevent and reduce disease flares 8/6/2025 -- -- -- Clinical Management - 2/6/2025     Goal: Remission or low disease activity 8/6/2025 -- -- -- Clinical Management - 2/6/2025            Tolerance  Patient has experienced side effects from this medication: No  Changes to current therapy regimen: No    The follow-up timeline was discussed. Every person responds to and reacts to therapy differently. Patient should be assessed  for efficacy and tolerability in approximately: 3 months            Adherence  Patient Information  Informant: Self (Patient)  Demonstrates Understanding of Importance of Adherence: Yes  Does the patient have any barriers to self-administration (including physical and mental?): No  Support Network for Adherence: Family Member  Adherence Tools Used: Calendar  Medication Information  Medication: sarilumab (Kevzara)  Patient Reported Missed Doses in the Last 4 Weeks: 0  Estimated Medication Adherence Level: %  Adherence Estimation Source: Claims history  Barriers to Adherence: No Problems identified   The importance of adherence was discussed and patient/caregiver was advised to take the medication as prescribed by their provider. Encouraged patient/caregiver to call physician's office or specialty pharmacy if they have a question regarding a missed dose.    General Assessment  Changes to home medications, OTCs or supplements: No  Current Medications[1]  Reported new allergies: No  Reported new medical conditions: No  Additional monitoring reviewed: Rheumatology- CBC-diff:   Lab Results   Component Value Date    WBC 4.9 04/30/2025    RBC 4.46 04/30/2025    HGB 14.7 04/30/2025    HCT 43.6 04/30/2025    MCV 97.8 04/30/2025    MCHC 33.7 04/30/2025     04/30/2025    RDW 11.5 04/30/2025    NEUTOPHILPCT 82.5 12/06/2024    IGPCT 0.7 12/06/2024    LYMPHOPCT 13.9 04/30/2025    MONOPCT 12.0 04/30/2025    EOSPCT 0.2 04/30/2025    BASOPCT 0.2 04/30/2025    NEUTROABS 6.73 12/06/2024    LYMPHSABS 0.76 (L) 12/06/2024    MONOSABS 0.53 12/06/2024    EOSABS 10 (L) 04/30/2025    BASOSABS 10 04/30/2025   , CMP:   Lab Results   Component Value Date    GLUCOSE 133 (H) 04/30/2025     04/30/2025    K 4.1 04/30/2025     04/30/2025    CO2 26 04/30/2025    ANIONGAP 7 04/30/2025    BUN 32 (H) 04/30/2025    CREATININE 0.83 04/30/2025    CALCIUM 9.1 04/30/2025    ALBUMIN 4.4 04/30/2025    ALKPHOS 38 04/30/2025    PROT 6.5  "04/30/2025    AST 30 04/30/2025    BILITOT 0.7 04/30/2025    ALT 55 (H) 04/30/2025   , TB:   Lab Results   Component Value Date    TBSIN Negative 12/06/2024   , Hepatitis B panel:   Lab Results   Component Value Date    HEPBSAG Nonreactive 12/06/2024   , Hepatitis C antibody:   Lab Results   Component Value Date    HEPCAB Nonreactive 12/06/2024   , LFTs and bilirubin:   Lab Results   Component Value Date    ALT 55 (H) 04/30/2025    AST 30 04/30/2025    ALKPHOS 38 04/30/2025    BILITOT 0.7 04/30/2025   , CRP:   Lab Results   Component Value Date    CRP <3.0 04/30/2025   , Lipid panel:   Lab Results   Component Value Date    CHOL 231 (H) 07/28/2023    HDL 60.9 07/28/2023    CHHDL 3.8 07/28/2023    VLDL 36 07/28/2023    TRIG 178 (H) 07/28/2023   , AURELIO: No results found for: \"AURELIO\", AURELIO Titer/LUIS ALBERTO Panel: No results found for: \"ANATITER\", \"ANAPATTRN\", \"ASM\", \"ARNP\", \"ASMRN\", \"ASSA\", \"ASSB\", \"ASCL\", \"JO1\", \"ACHR\", \"ACEN\", \"RIBPP\", \"DNADS\", ESR:    Lab Results   Component Value Date    SEDRATE 2 04/30/2025   , C3 & C4 Complements: No results found for: \"C3\", \"C4\", CK/CPK: No components found for: \"CK\", \"CPK\", HLA-B27: No results found for: \"HLAB27\", Rheumatoid factor: No components found for: \"CK\", \"CPK\", CCP:   Lab Results   Component Value Date    CITAB <1 07/28/2023   , G6PD: No results found for: \"J2FYUFDD\", Uric Acid:  No results found for: \"URICACID\", Urinalysis:  No results found for: \"WBCU\", \"RBCU\", and Microalbumin-creatinine ration: No results found for: \"ALBUMINUR\", \"CREATU\", \"MICROALBCREA\"  Is laboratory follow up needed? No    Advised to contact the pharmacy if there are any changes to the patient's medication list, including prescriptions, OTC medications, herbal products, or supplements.    Impression/Plan  This patient has not been identified as high risk due to Lack of high risk qualifiers.  The following action was taken:N/A          QOL/Patient Satisfaction  Rate your quality of life on scale of 1-10:  " (unable to assess)  Rate your satisfaction with  Specialty Pharmacy on scale of 1-10:  (unable to assess)    Provided contact information ([phone]) for [facility] and reviewed dispensing process, refill timeline and patient management follow up. Confirmed understanding of education conducted during assessment. All questions and concerns were addressed and patient/caregiver was encouraged to reach out for additional questions or concerns.    Based on the patient's diagnosis, medication list, progress towards goals, adherence, tolerance, and medication list, medication remains appropriate: Therapy remains appropriate (I attest)    Jose Garza, PharmD       [1]   Current Outpatient Medications   Medication Sig Dispense Refill    atorvastatin (Lipitor) 40 mg tablet Take 1 tablet (40 mg) by mouth once daily. TAKE PER DIRECTED      citalopram (CeleXA) 20 mg tablet Take 1 tablet (20 mg) by mouth once daily. TAKE PER DIRECTED      lisinopril 10 mg tablet Take 1 tablet (10 mg) by mouth once daily. TAKE PER DIRECTED      methotrexate (Trexall) 2.5 mg tablet TAKE 7 TABLETS ONE TIME PER WEEK FOLLOW DIRECTIONS CAREFULLY AND ASK TO EXPLAIN ANY PART YOU DO NOT UNDERSTAND TAKE EXACTLY AS DIRECTED 84 tablet 3    omeprazole (PriLOSEC) 40 mg DR capsule Take 1 capsule (40 mg) by mouth. Do not crush or chew.  TAKE PER DIRECTED      predniSONE (Deltasone) 1 mg tablet TAKE 7 TABLETS ONCE DAILY 210 tablet 11    predniSONE (Deltasone) 5 mg tablet Take 2 tablets (10 mg) by mouth once daily. 180 tablet 0    sarilumab (Kevzara) 200 mg/1.14 mL subcuatenous pen injector Inject 1.14 mL (200 mg) under the skin every 7 days. 4.56 mL 2    tadalafil (Cialis) 5 mg tablet Take 1 tablet (5 mg) by mouth if needed for erectile dysfunction.       No current facility-administered medications for this visit.

## 2025-07-25 ENCOUNTER — APPOINTMENT (OUTPATIENT)
Dept: RHEUMATOLOGY | Facility: CLINIC | Age: 70
End: 2025-07-25
Payer: MEDICARE

## 2025-08-04 DIAGNOSIS — M35.3 POLYMYALGIA RHEUMATICA (MULTI): ICD-10-CM

## 2025-08-04 RX ORDER — SARILUMAB 200 MG/1.14ML
200 INJECTION, SOLUTION SUBCUTANEOUS
Qty: 6.84 ML | Refills: 1 | Status: SHIPPED | OUTPATIENT
Start: 2025-08-04 | End: 2026-01-31

## 2025-08-11 ENCOUNTER — SPECIALTY PHARMACY (OUTPATIENT)
Dept: PHARMACY | Facility: CLINIC | Age: 70
End: 2025-08-11

## 2025-08-11 DIAGNOSIS — M35.3 POLYMYALGIA RHEUMATICA (MULTI): ICD-10-CM

## 2025-08-11 PROCEDURE — RXMED WILLOW AMBULATORY MEDICATION CHARGE

## 2025-08-11 RX ORDER — SARILUMAB 200 MG/1.14ML
200 INJECTION, SOLUTION SUBCUTANEOUS
Qty: 4.56 ML | Refills: 1 | Status: SHIPPED | OUTPATIENT
Start: 2025-08-11

## 2025-08-12 ENCOUNTER — PHARMACY VISIT (OUTPATIENT)
Dept: PHARMACY | Facility: CLINIC | Age: 70
End: 2025-08-12
Payer: COMMERCIAL

## 2025-08-13 ENCOUNTER — SPECIALTY PHARMACY (OUTPATIENT)
Dept: PHARMACY | Facility: CLINIC | Age: 70
End: 2025-08-13

## 2025-09-17 ENCOUNTER — APPOINTMENT (OUTPATIENT)
Dept: RHEUMATOLOGY | Facility: CLINIC | Age: 70
End: 2025-09-17
Payer: MEDICARE

## 2025-09-26 ENCOUNTER — APPOINTMENT (OUTPATIENT)
Dept: RHEUMATOLOGY | Facility: CLINIC | Age: 70
End: 2025-09-26
Payer: MEDICARE

## 2025-10-03 ENCOUNTER — APPOINTMENT (OUTPATIENT)
Dept: RHEUMATOLOGY | Facility: CLINIC | Age: 70
End: 2025-10-03
Payer: MEDICARE

## 2025-11-12 ENCOUNTER — APPOINTMENT (OUTPATIENT)
Dept: RHEUMATOLOGY | Facility: CLINIC | Age: 70
End: 2025-11-12
Payer: MEDICARE